# Patient Record
Sex: FEMALE | Race: WHITE | NOT HISPANIC OR LATINO | Employment: FULL TIME | URBAN - METROPOLITAN AREA
[De-identification: names, ages, dates, MRNs, and addresses within clinical notes are randomized per-mention and may not be internally consistent; named-entity substitution may affect disease eponyms.]

---

## 2017-03-23 ENCOUNTER — HOSPITAL ENCOUNTER (OUTPATIENT)
Dept: MAMMOGRAPHY | Facility: CLINIC | Age: 57
Discharge: HOME | End: 2017-03-23
Attending: OBSTETRICS & GYNECOLOGY | Admitting: OBSTETRICS & GYNECOLOGY
Payer: COMMERCIAL

## 2017-03-23 DIAGNOSIS — Z12.31 VISIT FOR SCREENING MAMMOGRAM: ICD-10-CM

## 2017-03-23 PROCEDURE — G0202 SCR MAMMO BI INCL CAD: HCPCS

## 2017-04-06 ENCOUNTER — HOSPITAL ENCOUNTER (OUTPATIENT)
Dept: MAMMOGRAPHY | Facility: CLINIC | Age: 57
End: 2017-04-06
Attending: OBSTETRICS & GYNECOLOGY
Payer: COMMERCIAL

## 2017-04-06 ENCOUNTER — HOSPITAL ENCOUNTER (OUTPATIENT)
Dept: MAMMOGRAPHY | Facility: CLINIC | Age: 57
Discharge: HOME | End: 2017-04-06
Attending: OBSTETRICS & GYNECOLOGY | Admitting: OBSTETRICS & GYNECOLOGY
Payer: COMMERCIAL

## 2017-04-06 DIAGNOSIS — R92.8 ABNORMAL MAMMOGRAM: ICD-10-CM

## 2017-04-06 PROCEDURE — 76642 ULTRASOUND BREAST LIMITED: CPT | Mod: RT

## 2017-04-06 PROCEDURE — G0279 TOMOSYNTHESIS, MAMMO: HCPCS

## 2017-04-11 ENCOUNTER — HOSPITAL ENCOUNTER (OUTPATIENT)
Dept: MAMMOGRAPHY | Facility: CLINIC | Age: 57
End: 2017-04-11
Attending: OBSTETRICS & GYNECOLOGY
Payer: COMMERCIAL

## 2017-04-11 ENCOUNTER — HOSPITAL ENCOUNTER (OUTPATIENT)
Dept: MAMMOGRAPHY | Facility: CLINIC | Age: 57
Discharge: HOME | End: 2017-04-11
Attending: OBSTETRICS & GYNECOLOGY | Admitting: OBSTETRICS & GYNECOLOGY
Payer: COMMERCIAL

## 2017-04-11 DIAGNOSIS — R92.8 ABNORMAL MAMMOGRAM: ICD-10-CM

## 2017-04-11 PROCEDURE — 00000159 ZZHCL STATISTIC H-SEND OUTS PREP: Performed by: OBSTETRICS & GYNECOLOGY

## 2017-04-11 PROCEDURE — 88342 IMHCHEM/IMCYTCHM 1ST ANTB: CPT | Mod: 26 | Performed by: OBSTETRICS & GYNECOLOGY

## 2017-04-11 PROCEDURE — 88377 M/PHMTRC ALYS ISHQUANT/SEMIQ: CPT | Performed by: OBSTETRICS & GYNECOLOGY

## 2017-04-11 PROCEDURE — 88360 TUMOR IMMUNOHISTOCHEM/MANUAL: CPT | Mod: 26 | Performed by: OBSTETRICS & GYNECOLOGY

## 2017-04-11 PROCEDURE — 00000158 ZZHCL STATISTIC H-FISH PROCESS B/S: Performed by: OBSTETRICS & GYNECOLOGY

## 2017-04-11 PROCEDURE — 88305 TISSUE EXAM BY PATHOLOGIST: CPT | Mod: 26 | Performed by: OBSTETRICS & GYNECOLOGY

## 2017-04-11 PROCEDURE — 00000093 ZZHCL STATISTIC COURTESY CONSULT: Performed by: OBSTETRICS & GYNECOLOGY

## 2017-04-11 PROCEDURE — 88305 TISSUE EXAM BY PATHOLOGIST: CPT | Performed by: OBSTETRICS & GYNECOLOGY

## 2017-04-11 PROCEDURE — 88360 TUMOR IMMUNOHISTOCHEM/MANUAL: CPT | Performed by: OBSTETRICS & GYNECOLOGY

## 2017-04-11 PROCEDURE — 19084 BX BREAST ADD LESION US IMAG: CPT | Mod: RT

## 2017-04-11 PROCEDURE — 25000125 ZZHC RX 250: Performed by: OBSTETRICS & GYNECOLOGY

## 2017-04-11 PROCEDURE — 88377 M/PHMTRC ALYS ISHQUANT/SEMIQ: CPT | Performed by: PATHOLOGY

## 2017-04-11 PROCEDURE — 88341 IMHCHEM/IMCYTCHM EA ADD ANTB: CPT | Performed by: OBSTETRICS & GYNECOLOGY

## 2017-04-11 PROCEDURE — 40000272 MA POST PROCEDURE RIGHT

## 2017-04-11 PROCEDURE — 88341 IMHCHEM/IMCYTCHM EA ADD ANTB: CPT | Mod: 26 | Performed by: OBSTETRICS & GYNECOLOGY

## 2017-04-11 PROCEDURE — 88342 IMHCHEM/IMCYTCHM 1ST ANTB: CPT | Performed by: OBSTETRICS & GYNECOLOGY

## 2017-04-11 PROCEDURE — 27210206 US BREAST BIOPSY CORE NEEDLE RIGHT

## 2017-04-11 RX ADMIN — LIDOCAINE HYDROCHLORIDE 5 ML: 10 INJECTION, SOLUTION INFILTRATION; PERINEURAL at 11:05

## 2017-04-11 NOTE — DISCHARGE INSTRUCTIONS

## 2017-04-13 ENCOUNTER — TELEPHONE (OUTPATIENT)
Dept: MAMMOGRAPHY | Facility: CLINIC | Age: 57
End: 2017-04-13

## 2017-04-13 NOTE — TELEPHONE ENCOUNTER
Discussed other options besides [facility] for follow up, now considering [facility]- gave her info on surgeon  Dr Castrejon and oncologists.there and phone contacts. She is networking and will make her decision later. She stated she could make appointment on her own from here and appreciated the assistance. Asked that I not send the resources packet in the mail.

## 2017-04-13 NOTE — TELEPHONE ENCOUNTER
Pathology report reviewed with breast radiologist Dr Barcenas  I phoned and informed patient of results showing invasive ductal breast cancer in both areas biopsied. DCIS with one area. Breast Imaging also showed calcifications at a 3rd (6;00) area which also needs to be addressed if breast conservation is considered.  Recommended follow up is Surgical consult  Patient states no problems with biopsy site, except bruising and minor achiness.  Surgical Consult wll be arranged by the patient. She has numbers for [facility]. Also gave her my number and film room number to arrange for transfer of records. She stated she would call me back with final plans  Questions were answered and I explained my role as Nurse Navigator in assisting her with appointments, resources and social support. New diagnosis information packet will be mailed. I will follow up with the patient. She has my phone number if she has further questions. Ordering provider Dr Meraz notified.

## 2017-04-14 LAB — COPATH REPORT: NORMAL

## 2017-04-18 LAB — COPATH REPORT: NORMAL

## 2017-04-19 ENCOUNTER — OFFICE VISIT (OUTPATIENT)
Dept: SURGERY | Facility: CLINIC | Age: 57
End: 2017-04-19
Payer: COMMERCIAL

## 2017-04-19 VITALS
HEART RATE: 63 BPM | SYSTOLIC BLOOD PRESSURE: 104 MMHG | WEIGHT: 144 LBS | DIASTOLIC BLOOD PRESSURE: 68 MMHG | BODY MASS INDEX: 22.6 KG/M2 | HEIGHT: 67 IN

## 2017-04-19 DIAGNOSIS — C50.111 MALIGNANT NEOPLASM OF CENTRAL PORTION OF RIGHT FEMALE BREAST (H): Primary | ICD-10-CM

## 2017-04-19 LAB — COPATH REPORT: NORMAL

## 2017-04-19 PROCEDURE — 99244 OFF/OP CNSLTJ NEW/EST MOD 40: CPT | Performed by: SURGERY

## 2017-04-19 NOTE — PROGRESS NOTES
Surgery Consultation, [facility], ROYER Saab MD    Janet Gibson MRN# [id number]   YOB: 1960 Age: 56 year old     PCP:  Lore Meraz [phone]    Chief Complaint:  New diagnosis of invasive ductal carcinoma of the right breast    Pt was seen in consultation from Lore Meraz.    History of Present Illness:  Janet Gibson is a 56 year old female who presented with two areas of suspicion on screening mammogram of the right breast.  These were followed up with diagnostic mammography and declan synthesis.  The two areas were well  and each measured less than 1 cm.  Biopsy was performed and these were both grade 1 invasive ductal carcinoma, strongly ER and NM positive.  These were HER-2/fili negative.  One of the lesions had some associated DCIS.  No lymphadenopathy was reported.  Patient has no personal history of breast issues or previous breast biopsies.  No family history of breast cancer.  She is postmenopausal and has been taking estrogen replacement, although she recently stopped with her diagnosis.  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  She is here to discuss her new diagnosis of right-sided breast cancer.    PMH:  Janet Gibson  has no past medical history on file.  PSH:  Janet Gibson  has a past surgical history that includes colonoscopy; Colonoscopy (N/A, 3/24/2015); and GYN surgery.    Home medications and allergies reviewed.    Social History:  Janet Gibson  reports that she has never smoked. She does not have any smokeless tobacco history on file. She reports that she drinks alcohol. She reports that she does not use illicit drugs.  Family History:  Janet Gibson family history includes Anesthesia Reaction in her mother; Cancer - colorectal (age of onset: 59) in her father; Hyperlipidemia in her father; Hypertension in her father and mother; Lymphoma in her brother.    ROS:  The 10 point Review  "of Systems is negative other than noted in the HPI.  No headaches or vision changes.  No weight gain or weight loss.  No fevers or chills.    Physical Exam:  Blood pressure 104/68, pulse 63, height 1.702 m (5' 7\"), weight 65.3 kg (144 lb).  144 lbs 0 oz  Thin healthy-appearing female in no distress.  Patient has a pleasant affect and communicates well.   Pupils equal round and reactive to light.   No cervical lymphadenopathy or thyromegaly.   Lung fields clear, breathing comfortably.   Heart normal sinus rhythm.  No murmurs rubs or gallops.  Bilateral breast exam performed.  Very small breasts bilaterally, no significant ptosis.  Moderate ecchymoses to the right breast.  No palpable lesions but breast tissue fairly dense.  No lesions on the left side.  No axillary or supraclavicular lymphadenopathy.  Skin warm, dry.  No obvious rashes or lesions.    All new lab and imaging data was reviewed, including pathology results and personal review of imaging studies.       Assessment/plan:  Pleasant healthy 56-year-old female with a new diagnosis of multifocal right-sided breast cancer.  This was a grade 1 invasive ductal carcinoma.  This is ER/VA positive, HER-2/fili negative.  She has no family history of breast cancer, but we did discuss the potential merits of genetic counseling.  We discussed the surgical management of breast cancer, which usually consists of either lumpectomy or mastectomy.  I question the cosmetic outcome of a lumpectomy, given her extremely small breast size and the fact that she would need two separate lumpectomies.  In general, given her small breast size and the two lesions, I'm inclined to favor nipple sparing mastectomy.  We discussed the logistics of this procedure.  This would be accompanied by sentinel lymph node biopsy with possible axillary node dissection.  We also discussed the potential role of radiation with lumpectomy or mastectomy and chemotherapy.  At this point, I don't see any " obvious indications which would mandate chemotherapy, but this is certainly a matter to be discussed more closely with the medical oncologist.  I explained that mastectomy and lumpectomy followed by radiation carry similar survival rates at 20 years.  She was in the process of getting information was likely going to be getting a second opinion.  If she is interested in proceeding further with my group, I would likely get her scheduled me with one plastic surgeons to discuss possible reconstruction options.  Overall, I feel that these small low-grade tumors would give her excellent prognosis.    Rahul Saab M.D.  [facility]  [phone]    Please route or send letter to:  Primary Care Provider (PCP) and Referring Provider

## 2017-04-19 NOTE — MR AVS SNAPSHOT
"              After Visit Summary   2017    Janet Gibson    MRN: [id number]           Patient Information     Date Of Birth          1960        Visit Information        Provider Department      2017 11:15 AM Isaiah Saab MD [facility] General Surgery       Follow-ups after your visit        Who to contact     If you have questions or need follow up information about today's clinic visit or your schedule please contact [facility] directly at [phone].  Normal or non-critical lab and imaging results will be communicated to you by ACellhart, letter or phone within 4 business days after the clinic has received the results. If you do not hear from us within 7 days, please contact the clinic through ACellhart or phone. If you have a critical or abnormal lab result, we will notify you by phone as soon as possible.  Submit refill requests through Foody or call your pharmacy and they will forward the refill request to us. Please allow 3 business days for your refill to be completed.          Additional Information About Your Visit        MyChart Information     Foody lets you send messages to your doctor, view your test results, renew your prescriptions, schedule appointments and more. To sign up, go to [email or web address] . Click on \"Log in\" on the left side of the screen, which will take you to the Welcome page. Then click on \"Sign up Now\" on the right side of the page.     You will be asked to enter the access code listed below, as well as some personal information. Please follow the directions to create your username and password.     Your access code is: [email or web address]  Expires: 2017 12:16 PM     Your access code will  in 90 days. If you need help or a new code, please call your [place] clinic or [phone].        Care EveryWhere ID     This is your Care EveryWhere ID. This could be used by other " "organizations to access your [place] medical records  [id number]        Your Vitals Were     Pulse Height BMI (Body Mass Index)             63 5' 7\" (1.702 m) 22.55 kg/m2          Blood Pressure from Last 3 Encounters:   04/19/17 104/68   03/24/15 104/69    Weight from Last 3 Encounters:   04/19/17 144 lb (65.3 kg)   03/24/15 143 lb (64.9 kg)              Today, you had the following     No orders found for display       Primary Care Provider Office Phone # Fax #    Lore Karely Meraz [phone]       [facility] [address] [facility] [address]  [facility] [address]        Thank you!     Thank you for choosing [facility]  for your care. Our goal is always to provide you with excellent care. Hearing back from our patients is one way we can continue to improve our services. Please take a few minutes to complete the written survey that you may receive in the mail after your visit with us. Thank you!             Your Updated Medication List - Protect others around you: Learn how to safely use, store and throw away your medicines at www.disposemymeds.org.          This list is accurate as of: 4/19/17 12:16 PM.  Always use your most recent med list.                   Brand Name Dispense Instructions for use    LEXAPRO PO      Take 5 mg by mouth At Bedtime         "

## 2017-04-19 NOTE — NURSING NOTE
Breast Patients    BREAST PATIENTS (ALL)    1-Do you have any of the following symptoms? None   2-In which breast are you having the symptoms? right  3-Do you use hormones?  No, just stopped Estradiol   4-Have you had a Mammogram? Yes  Where: [facility]  Date: 4/6/17  5-Have you ever had a breast cyst drained? No  6-Have you ever had a breast biopsy? Yes  Side: Right  Date: 4/11/17  7-Have you ever had a Breast Cancer? No   8-Is there a history of Breast Cancer in your family? No  9-Have you ever had Ovarian Cancer? No  10-Is there a history of Ovarian Cancer in your family? No  11-Summarize your caffeine intake (i.e. coffee, tea, chocolate, soda etc.): 2 cups of coffee daily     BREAST PATIENTS (FEMALE)    12-What age did your periods begin? 15  13-Date your last menstrual period began? Age 49  14-Number of full-term pregnancies: 3  15-Your age when your first child was born? 30  16-Did you nurse your children? Yes  17-Are you pregnant now? No  18-Have you begun menopause? Yes  Age Menopause began:  49  19-Have you had either ovary removed?No  20-Do you have breast implants? No     Paloma Camacho MA

## 2020-02-18 RX ORDER — LIDOCAINE 40 MG/G
CREAM TOPICAL
Status: CANCELLED | OUTPATIENT
Start: 2020-02-18

## 2020-02-18 RX ORDER — ONDANSETRON 2 MG/ML
4 INJECTION INTRAMUSCULAR; INTRAVENOUS
Status: CANCELLED | OUTPATIENT
Start: 2020-02-18

## 2020-05-07 DIAGNOSIS — Z11.59 ENCOUNTER FOR SCREENING FOR OTHER VIRAL DISEASES: Primary | ICD-10-CM

## 2020-06-20 DIAGNOSIS — Z11.59 ENCOUNTER FOR SCREENING FOR OTHER VIRAL DISEASES: ICD-10-CM

## 2020-06-20 PROCEDURE — 99000 SPECIMEN HANDLING OFFICE-LAB: CPT | Performed by: COLON & RECTAL SURGERY

## 2020-06-20 PROCEDURE — U0003 INFECTIOUS AGENT DETECTION BY NUCLEIC ACID (DNA OR RNA); SEVERE ACUTE RESPIRATORY SYNDROME CORONAVIRUS 2 (SARS-COV-2) (CORONAVIRUS DISEASE [COVID-19]), AMPLIFIED PROBE TECHNIQUE, MAKING USE OF HIGH THROUGHPUT TECHNOLOGIES AS DESCRIBED BY CMS-2020-01-R: HCPCS | Mod: 90 | Performed by: COLON & RECTAL SURGERY

## 2020-06-21 LAB
SARS-COV-2 RNA SPEC QL NAA+PROBE: NOT DETECTED
SPECIMEN SOURCE: NORMAL

## 2020-06-23 ENCOUNTER — HOSPITAL ENCOUNTER (OUTPATIENT)
Facility: CLINIC | Age: 60
Discharge: HOME | End: 2020-06-23
Attending: COLON & RECTAL SURGERY | Admitting: COLON & RECTAL SURGERY
Payer: COMMERCIAL

## 2020-06-23 VITALS
DIASTOLIC BLOOD PRESSURE: 71 MMHG | SYSTOLIC BLOOD PRESSURE: 108 MMHG | HEART RATE: 50 BPM | OXYGEN SATURATION: 100 % | RESPIRATION RATE: 10 BRPM | HEIGHT: 67 IN | BODY MASS INDEX: 21.97 KG/M2 | WEIGHT: 140 LBS

## 2020-06-23 LAB — COLONOSCOPY: NORMAL

## 2020-06-23 PROCEDURE — 25000128 H RX IP 250 OP 636: Performed by: COLON & RECTAL SURGERY

## 2020-06-23 PROCEDURE — G0500 MOD SEDAT ENDO SERVICE >5YRS: HCPCS | Performed by: COLON & RECTAL SURGERY

## 2020-06-23 PROCEDURE — 99153 MOD SED SAME PHYS/QHP EA: CPT

## 2020-06-23 PROCEDURE — 45378 DIAGNOSTIC COLONOSCOPY: CPT | Performed by: COLON & RECTAL SURGERY

## 2020-06-23 PROCEDURE — G0105 COLORECTAL SCRN; HI RISK IND: HCPCS | Performed by: COLON & RECTAL SURGERY

## 2020-06-23 RX ORDER — DIPHENHYDRAMINE HYDROCHLORIDE 50 MG/ML
INJECTION INTRAMUSCULAR; INTRAVENOUS PRN
Status: DISCONTINUED | OUTPATIENT
Start: 2020-06-23 | End: 2020-06-23 | Stop reason: HOSPADM

## 2020-06-23 RX ORDER — ONDANSETRON 2 MG/ML
4 INJECTION INTRAMUSCULAR; INTRAVENOUS
Status: COMPLETED | OUTPATIENT
Start: 2020-06-23 | End: 2020-06-23

## 2020-06-23 RX ORDER — NALOXONE HYDROCHLORIDE 0.4 MG/ML
.1-.4 INJECTION, SOLUTION INTRAMUSCULAR; INTRAVENOUS; SUBCUTANEOUS
Status: DISCONTINUED | OUTPATIENT
Start: 2020-06-23 | End: 2020-06-23 | Stop reason: HOSPADM

## 2020-06-23 RX ORDER — LIDOCAINE 40 MG/G
CREAM TOPICAL
Status: DISCONTINUED | OUTPATIENT
Start: 2020-06-23 | End: 2020-06-23 | Stop reason: HOSPADM

## 2020-06-23 RX ORDER — FLUMAZENIL 0.1 MG/ML
0.2 INJECTION, SOLUTION INTRAVENOUS
Status: DISCONTINUED | OUTPATIENT
Start: 2020-06-23 | End: 2020-06-23 | Stop reason: HOSPADM

## 2020-06-23 RX ORDER — FENTANYL CITRATE 50 UG/ML
INJECTION, SOLUTION INTRAMUSCULAR; INTRAVENOUS PRN
Status: DISCONTINUED | OUTPATIENT
Start: 2020-06-23 | End: 2020-06-23 | Stop reason: HOSPADM

## 2020-06-23 RX ORDER — ONDANSETRON 4 MG/1
4 TABLET, ORALLY DISINTEGRATING ORAL EVERY 6 HOURS PRN
Status: DISCONTINUED | OUTPATIENT
Start: 2020-06-23 | End: 2020-06-23 | Stop reason: HOSPADM

## 2020-06-23 RX ORDER — PROCHLORPERAZINE MALEATE 10 MG
10 TABLET ORAL EVERY 6 HOURS PRN
Status: DISCONTINUED | OUTPATIENT
Start: 2020-06-23 | End: 2020-06-23 | Stop reason: HOSPADM

## 2020-06-23 RX ORDER — ONDANSETRON 2 MG/ML
4 INJECTION INTRAMUSCULAR; INTRAVENOUS EVERY 6 HOURS PRN
Status: DISCONTINUED | OUTPATIENT
Start: 2020-06-23 | End: 2020-06-23 | Stop reason: HOSPADM

## 2020-06-23 ASSESSMENT — MIFFLIN-ST. JEOR: SCORE: 1242.67

## 2020-06-23 NOTE — H&P
"Pre-Endoscopy History and Physical   Janet Gibson MRN# [id number]   YOB: 1960 Age: 59 year old   Date of Procedure: 6/23/2020   Primary care provider: Lore Meraz   Type of Endoscopy: colonoscopy   Reason for Procedure: personal history of polyps   Type of Anesthesia Anticipated: Moderate Sedation   HPI:   Janet is a 59 year old female with a personal history of polyps.  She last had a colonoscopy in 2015 that was normal.  She denies BRBPR, abdominal pain, nausea/vomiting, changes in bowel habits or unintentional weight loss.  Her father had colon cancer in his 50s.    No Known Allergies   None      Patient Active Problem List   Diagnosis     Malignant neoplasm of central portion of right female breast (H)      Past Medical History:   Diagnosis Date     Cancer (H)     breast cancer treated with surgery only     Colon polyp       Past Surgical History:   Procedure Laterality Date     BREAST SURGERY      right mastectomy and replacement     COLONOSCOPY       COLONOSCOPY N/A 3/24/2015    Procedure: COLONOSCOPY;  Surgeon: Karely Junior MD;  Location:  GI     GYN SURGERY      ovarian cyst removed      Social History     Tobacco Use     Smoking status: Never Smoker     Smokeless tobacco: Never Used   Substance Use Topics     Alcohol use: Yes     Comment: 2 week      Family History   Problem Relation Age of Onset     Cancer - colorectal Father 59     Hypertension Father      Hyperlipidemia Father      Hypertension Mother      Anesthesia Reaction Mother      Lymphoma Brother       PHYSICAL EXAM:   /86   Resp 16   Ht 1.702 m (5' 7\")   Wt 63.5 kg (140 lb)   SpO2 100%   BMI 21.93 kg/m   Estimated body mass index is 21.93 kg/m  as calculated from the following:    Height as of this encounter: 1.702 m (5' 7\").    Weight as of this encounter: 63.5 kg (140 lb).   RESP: lungs clear to auscultation - no rales, rhonchi or wheezes   CV: regular rates and rhythm   ASA Class 2 - Mild " systemic disease    Assessment: 58 y/o woman with a personal history of polyps and a FH of CRC.    Plan: Colonoscopy with moderate sedation.  Risks of the procedure were discussed including, but not limited to, bleeding, perforation and missed lesions.  Patient understands and is willing to proceed.    Sebastián Gee MD ....................  6/23/2020   9:06 AM  Colon and Rectal Surgery Staff  [phone]

## 2020-11-11 ENCOUNTER — VIRTUAL VISIT (OUTPATIENT)
Dept: FAMILY MEDICINE | Facility: OTHER | Age: 60
End: 2020-11-11
Payer: COMMERCIAL

## 2020-11-11 PROCEDURE — 99421 OL DIG E/M SVC 5-10 MIN: CPT | Performed by: PHYSICIAN ASSISTANT

## 2020-11-11 NOTE — PROGRESS NOTES
"Date: 2020 09:26:51  Clinician: Lainey Craig  Clinician NPI: [id number]  Patient: Janet Gibson  Patient : 1960  Patient Address: [address] [place] [address]  Patient Phone: [phone]  Visit Protocol: URI  Patient Summary:  Janet is a 60 year old ( : 1960 ) female who initiated a [facility] Visit for COVID-19 (Coronavirus) evaluation and screening. When asked the question \"Please sign me up to receive news, health information and promotions from [facility].\", Janet responded \"No\".    Janet states her symptoms started 1-2 days ago.   Her symptoms consist of myalgia, chills, malaise, a sore throat, a headache, a cough, and nasal congestion. She is experiencing difficulty breathing due to nasal congestion but she is not short of breath.   Symptom details     Nasal secretions: The color of her mucus is clear.    Cough: Janet coughs every 5-10 minutes and her cough is more bothersome at night. Phlegm does not come into her throat when she coughs. She does not believe her cough is caused by post-nasal drip.     Sore throat: Janet reports having mild throat pain (1-3 on a 10 point pain scale), does not have exudate on her tonsils, and can swallow liquids. The lymph nodes in her neck are not enlarged. A rash has not appeared on the skin since the sore throat started.     Headache: She states the headache is mild (1-3 on a 10 point pain scale).      Janet denies having vomiting, rhinitis, facial pain or pressure, teeth pain, ageusia, diarrhea, ear pain, wheezing, fever, enlarged lymph nodes, nausea, and anosmia. She also denies taking antibiotic medication in the past month and having recent facial or sinus surgery in the past 60 days.   Precipitating events  Within the past week, Janet has not been exposed to someone with strep throat. She has not recently been exposed to someone with influenza. Janet has not been in close contact with any high risk individuals.   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   Janet has not had a close contact with a laboratory-confirmed COVID-19 patient within 14 days of symptom onset.    Since December 2019, Janet has been tested for COVID-19 and has had upper respiratory infection (URI) or influenza-like illness.      Result of COVID-19 test: Negative     Date of her COVID-19 test: 06/10/2020     Date(s) of previous URI or influenza-like illness (free-text): 11/10/20     Symptoms Janet experienced during previous URI or influenza-like illness as reported by the patient (free-text): Current SX        Pertinent medical history  Janet does not get yeast infections when she takes antibiotics.   Janet does not need a return to work/school note.   Weight: 140 lbs   Janet does not smoke or use smokeless tobacco.   Weight: 140 lbs  Reason for repeat visit for the same protocol within 24 hours:  Didn't complete the last visit  See the History of referred by protocol and completed visits section for details on previous visits (visits currently in queue to be diagnosed will not appear in this section).    MEDICATIONS: No current medications, ALLERGIES: NKDA  Clinician Response:  Dear Janet,  Based on the information provided, you have a viral upper respiratory infection, otherwise known as a cold. Symptoms vary from person to person, but can include sneezing, coughing, a runny nose, sore throat, and headache and range from mild to severe.  Unfortunately, there are no medications that can cure a cold, so treatment is focused on controlling symptoms as much as possible. Most people gradually feel better until symptoms are  gone in 1-2 weeks.  Medication information  Because you have a viral infection, antibiotics will not help you get better. Treating a viral infection with antibiotics could actually make you feel worse.  Self care  Steps you can take to be as comfortable as possible:     Rest.    Drink plenty of fluids.    Take a warm shower to loosen congestion    Use a cool-mist humidifier.    Use throat lozenges.    Suck on frozen items such as popsicles.    Drink hot tea with lemon and honey.    Gargle with warm salt water (1/4 teaspoon of salt per 8 ounce glass of water).    Take a spoonful of honey to reduce your cough.     When to seek care  Please be seen in a clinic or urgent care if any of the following occur:   New symptoms develop, or symptoms become worse   Call ahead before going to the clinic or urgent care.  Call 911 or go to the emergency room if you feel that your throat is closing off, you suddenly develop a rash, you are unable to swallow fluids, you are drooling, or you are having difficulty breathing.  Additional treatment plan   Your symptoms show that you may have coronavirus (COVID-19). This illness can cause fever, cough and trouble breathing. Many people get a mild case and get better on their own. Some people can get very sick.  Based on the symptoms you have shared, I would like you to be re-checked in 2 to 3 days. Please call your family clinic to set up a video or phone visit.  Will I be tested for COVID-19?  We would like to test you for this virus.   Please call [phone] to schedule your visit. Explain that you were [facility] have [facility] COVID-19 [facility]. Be ready to share your [facility] visit ID number.   * If you need to schedule in [place] or [facility] please call [phone] or for [place] employees please call [phone].    The following will serve as your written order for this COVID Test, ordered by me, for the indication of suspected COVID [Z20.828]: The test will be ordered  "in Epic, our electronic health record, after you are scheduled. It will show as ordered and authorized by Lopez Duron MD.  Order: COVID-19 (Coronavirus) PCR for SYMPTOMATIC testing from [facility].   1.When it's time for your COVID test:   Stay at least 6 feet away from others. (If someone will drive you to your test, stay in the backseat, as far away from the  as you can.)   Cover your mouth and nose with a mask, tissue or washcloth.  Go straight to the testing site. Don't make any stops on the way there or back.      2.Starting now: Stay home and away from others (self-isolate) until:   You've had no fever---and no medicine that reduces fever---for one full day (24 hours). And...   Your other symptoms have gotten better. For example, your cough or breathing has improved. And...   At least 10 days have passed since your symptoms started.       During this time, don't leave the house except for testing or medical care.   Stay in your own room, even for meals. Use your own bathroom if you can.   Stay away from others in your home. No hugging, kissing or shaking hands. No visitors.  Don't go to work, school or anywhere else.    Clean \"high touch\" surfaces often (doorknobs, counters, handles, etc.). Use a household cleaning spray or wipes. You'll find a full list of  on the EPA website: www.epa.gov/pesticide-registration/list-n-disinfectants-use-against-sars-cov-2.   Cover your mouth and nose with a mask, tissue or washcloth to avoid spreading germs.  Wash your hands and face often. Use soap and water.  Caregivers in these groups are at risk for severe illness due to COVID-19:  o People 65 years and older  o People who live in a nursing home or long-term care facility  o People with chronic disease (lung, heart, cancer, diabetes, kidney, liver, immunologic)   o People who have a weakened immune system, including those who:   Are in cancer treatment  Take medicine that weakens the immune system, such as " corticosteroids  Had a bone marrow or organ transplant  Have an immune deficiency  Have poorly controlled HIV or AIDS  Are obese (body mass index of 40 or higher)  Smoke regularly   o Caregivers should wear gloves while washing dishes, handling laundry and cleaning bedrooms and bathrooms.  o Use caution when washing and drying laundry: Don't shake dirty laundry, and use the warmest water setting that you can.  o For more tips, go to www.cdc.gov/coronavirus/2019-ncov/downloads/10Things.pdf.      How can I take care of myself?   Get lots of rest. Drink extra fluids (unless a doctor has told you not to)   Take Tylenol (acetaminophen) for fever or pain. If you have liver or kidney problems, ask your family doctor if it's okay to take Tylenol.   Adults can take either:    650 mg (two 325 mg pills) every 4 to 6 hours, or...   1,000 mg (two 500 mg pills) every 8 hours as needed.    Note: Don't take more than 3,000 mg in one day. Acetaminophen is found in many medicines (both prescribed and over-the-counter medicines). Read all labels to be sure you don't take too much.   For children, check the Tylenol bottle for the right dose. The dose is based on the child's age or weight.    If you have other health problems (like cancer, heart failure, an organ transplant or severe kidney disease): Call your specialty clinic if you don't feel better in the next 2 days.       Know when to call 911. Emergency warning signs include:    Trouble breathing or shortness of breath Pain or pressure in the chest that doesn't go away Feeling confused like you haven't felt before, or not being able to wake up Bluish-colored lips or face  Where can I get more information?    [employer] -- About COVID-19: [email or web address]   CDC -- What to Do If You're Sick: www.cdc.gov/coronavirus/2019-ncov/about/steps-when-sick.html   CDC -- Ending Home Isolation: www.cdc.gov/coronavirus/2019-ncov/hcp/disposition-in-home-patients.html   [facility]  -- Caring for Someone: www.cdc.gov/coronavirus/2019-ncov/if-you-are-sick/care-for-someone.html   [facility] -- Interim Guidance for Hospital Discharge to Home: www.health.[facility].[place]/diseases/coronavirus/hcp/hospdischarge.pdf   [facility] clinical trials (COVID-19 research studies): clinicalaffairs.[facility]-clinical-trials    Below are the COVID-19 hotlines at the [place] Department of Health ([facility]). Interpreters are available.    For health questions: Call 174-095-7974 or 1-291.461.3486 (7 a.m. to 7 p.m.) For questions about schools and childcare: Call 477-421-2871 or 1-829.113.3191 (7 a.m. to 7 p.m.)       Diagnosis: Contact with and (suspected) exposure to other viral communicable diseases  Diagnosis ICD: Z20.828

## 2020-11-12 DIAGNOSIS — Z20.822 ENCOUNTER FOR LABORATORY TESTING FOR COVID-19 VIRUS: Primary | ICD-10-CM

## 2020-11-12 PROCEDURE — U0003 INFECTIOUS AGENT DETECTION BY NUCLEIC ACID (DNA OR RNA); SEVERE ACUTE RESPIRATORY SYNDROME CORONAVIRUS 2 (SARS-COV-2) (CORONAVIRUS DISEASE [COVID-19]), AMPLIFIED PROBE TECHNIQUE, MAKING USE OF HIGH THROUGHPUT TECHNOLOGIES AS DESCRIBED BY CMS-2020-01-R: HCPCS | Performed by: FAMILY MEDICINE

## 2020-11-13 LAB
SARS-COV-2 RNA SPEC QL NAA+PROBE: NOT DETECTED
SPECIMEN SOURCE: NORMAL

## 2021-01-15 ENCOUNTER — HEALTH MAINTENANCE LETTER (OUTPATIENT)
Age: 61
End: 2021-01-15

## 2021-10-24 ENCOUNTER — HEALTH MAINTENANCE LETTER (OUTPATIENT)
Age: 61
End: 2021-10-24

## 2022-02-13 ENCOUNTER — HEALTH MAINTENANCE LETTER (OUTPATIENT)
Age: 62
End: 2022-02-13

## 2022-10-15 ENCOUNTER — HEALTH MAINTENANCE LETTER (OUTPATIENT)
Age: 62
End: 2022-10-15

## 2023-03-26 ENCOUNTER — HEALTH MAINTENANCE LETTER (OUTPATIENT)
Age: 63
End: 2023-03-26

## 2024-08-07 ENCOUNTER — HOSPITAL ENCOUNTER (INPATIENT)
Facility: CLINIC | Age: 64
LOS: 2 days | Discharge: HOME | DRG: 372 | End: 2024-08-10
Attending: STUDENT IN AN ORGANIZED HEALTH CARE EDUCATION/TRAINING PROGRAM | Admitting: HOSPITALIST
Payer: COMMERCIAL

## 2024-08-07 ENCOUNTER — APPOINTMENT (OUTPATIENT)
Dept: CT IMAGING | Facility: CLINIC | Age: 64
DRG: 372 | End: 2024-08-07
Attending: STUDENT IN AN ORGANIZED HEALTH CARE EDUCATION/TRAINING PROGRAM
Payer: COMMERCIAL

## 2024-08-07 DIAGNOSIS — K76.9 LIVER LESION: ICD-10-CM

## 2024-08-07 DIAGNOSIS — A04.5 CAMPYLOBACTER DIARRHEA: Primary | ICD-10-CM

## 2024-08-07 DIAGNOSIS — E27.9 ADRENAL NODULE (H): ICD-10-CM

## 2024-08-07 DIAGNOSIS — K51.00 PANCOLITIS (H): ICD-10-CM

## 2024-08-07 DIAGNOSIS — D69.6 THROMBOCYTOPENIA (H): ICD-10-CM

## 2024-08-07 DIAGNOSIS — R91.1 PULMONARY NODULE: ICD-10-CM

## 2024-08-07 LAB
ALBUMIN SERPL BCG-MCNC: 4.1 G/DL (ref 3.5–5.2)
ALBUMIN UR-MCNC: 30 MG/DL
ALP SERPL-CCNC: 92 U/L (ref 40–150)
ALT SERPL W P-5'-P-CCNC: 44 U/L (ref 0–50)
ANION GAP SERPL CALCULATED.3IONS-SCNC: 10 MMOL/L (ref 7–15)
APPEARANCE UR: CLEAR
AST SERPL W P-5'-P-CCNC: 53 U/L (ref 0–45)
BACTERIA #/AREA URNS HPF: ABNORMAL /HPF
BASOPHILS # BLD AUTO: 0 10E3/UL (ref 0–0.2)
BASOPHILS NFR BLD AUTO: 0 %
BILIRUB SERPL-MCNC: 0.5 MG/DL
BILIRUB UR QL STRIP: NEGATIVE
BUN SERPL-MCNC: 9.1 MG/DL (ref 8–23)
C DIFF TOX B STL QL: NEGATIVE
CALCIUM SERPL-MCNC: 9.2 MG/DL (ref 8.8–10.4)
CHLORIDE SERPL-SCNC: 99 MMOL/L (ref 98–107)
COLOR UR AUTO: YELLOW
CREAT SERPL-MCNC: 0.85 MG/DL (ref 0.51–0.95)
EGFRCR SERPLBLD CKD-EPI 2021: 77 ML/MIN/1.73M2
EOSINOPHIL # BLD AUTO: 0 10E3/UL (ref 0–0.7)
EOSINOPHIL NFR BLD AUTO: 0 %
ERYTHROCYTE [DISTWIDTH] IN BLOOD BY AUTOMATED COUNT: 12 % (ref 10–15)
GLUCOSE SERPL-MCNC: 122 MG/DL (ref 70–99)
GLUCOSE UR STRIP-MCNC: NEGATIVE MG/DL
HCO3 SERPL-SCNC: 26 MMOL/L (ref 22–29)
HCT VFR BLD AUTO: 38.4 % (ref 35–47)
HGB BLD-MCNC: 12.7 G/DL (ref 11.7–15.7)
HGB UR QL STRIP: ABNORMAL
IMM GRANULOCYTES # BLD: 0 10E3/UL
IMM GRANULOCYTES NFR BLD: 0 %
KETONES UR STRIP-MCNC: 20 MG/DL
LACTATE SERPL-SCNC: 0.8 MMOL/L (ref 0.7–2)
LEUKOCYTE ESTERASE UR QL STRIP: ABNORMAL
LIPASE SERPL-CCNC: 14 U/L (ref 13–60)
LYMPHOCYTES # BLD AUTO: 0.5 10E3/UL (ref 0.8–5.3)
LYMPHOCYTES NFR BLD AUTO: 7 %
MCH RBC QN AUTO: 30.8 PG (ref 26.5–33)
MCHC RBC AUTO-ENTMCNC: 33.1 G/DL (ref 31.5–36.5)
MCV RBC AUTO: 93 FL (ref 78–100)
MONOCYTES # BLD AUTO: 0.4 10E3/UL (ref 0–1.3)
MONOCYTES NFR BLD AUTO: 6 %
NEUTROPHILS # BLD AUTO: 5.5 10E3/UL (ref 1.6–8.3)
NEUTROPHILS NFR BLD AUTO: 86 %
NITRATE UR QL: NEGATIVE
NRBC # BLD AUTO: 0 10E3/UL
NRBC BLD AUTO-RTO: 0 /100
PH UR STRIP: 7.5 [PH] (ref 5–7)
PLATELET # BLD AUTO: 121 10E3/UL (ref 150–450)
POTASSIUM SERPL-SCNC: 3.9 MMOL/L (ref 3.4–5.3)
PROT SERPL-MCNC: 6.4 G/DL (ref 6.4–8.3)
RBC # BLD AUTO: 4.13 10E6/UL (ref 3.8–5.2)
RBC URINE: 17 /HPF
SODIUM SERPL-SCNC: 135 MMOL/L (ref 135–145)
SP GR UR STRIP: 1.01 (ref 1–1.03)
SQUAMOUS EPITHELIAL: 13 /HPF
UROBILINOGEN UR STRIP-MCNC: NORMAL MG/DL
WBC # BLD AUTO: 6.4 10E3/UL (ref 4–11)
WBC URINE: 4 /HPF

## 2024-08-07 PROCEDURE — 250N000011 HC RX IP 250 OP 636: Performed by: STUDENT IN AN ORGANIZED HEALTH CARE EDUCATION/TRAINING PROGRAM

## 2024-08-07 PROCEDURE — 99285 EMERGENCY DEPT VISIT HI MDM: CPT | Mod: 25

## 2024-08-07 PROCEDURE — 96376 TX/PRO/DX INJ SAME DRUG ADON: CPT

## 2024-08-07 PROCEDURE — 250N000013 HC RX MED GY IP 250 OP 250 PS 637: Performed by: HOSPITALIST

## 2024-08-07 PROCEDURE — 258N000003 HC RX IP 258 OP 636: Performed by: HOSPITALIST

## 2024-08-07 PROCEDURE — 87493 C DIFF AMPLIFIED PROBE: CPT | Performed by: HOSPITALIST

## 2024-08-07 PROCEDURE — 36415 COLL VENOUS BLD VENIPUNCTURE: CPT | Performed by: STUDENT IN AN ORGANIZED HEALTH CARE EDUCATION/TRAINING PROGRAM

## 2024-08-07 PROCEDURE — 96361 HYDRATE IV INFUSION ADD-ON: CPT

## 2024-08-07 PROCEDURE — 250N000013 HC RX MED GY IP 250 OP 250 PS 637: Performed by: INTERNAL MEDICINE

## 2024-08-07 PROCEDURE — 81001 URINALYSIS AUTO W/SCOPE: CPT | Performed by: STUDENT IN AN ORGANIZED HEALTH CARE EDUCATION/TRAINING PROGRAM

## 2024-08-07 PROCEDURE — 82247 BILIRUBIN TOTAL: CPT | Performed by: STUDENT IN AN ORGANIZED HEALTH CARE EDUCATION/TRAINING PROGRAM

## 2024-08-07 PROCEDURE — G0378 HOSPITAL OBSERVATION PER HR: HCPCS

## 2024-08-07 PROCEDURE — 258N000003 HC RX IP 258 OP 636: Performed by: STUDENT IN AN ORGANIZED HEALTH CARE EDUCATION/TRAINING PROGRAM

## 2024-08-07 PROCEDURE — 85025 COMPLETE CBC W/AUTO DIFF WBC: CPT | Performed by: STUDENT IN AN ORGANIZED HEALTH CARE EDUCATION/TRAINING PROGRAM

## 2024-08-07 PROCEDURE — 258N000003 HC RX IP 258 OP 636: Performed by: INTERNAL MEDICINE

## 2024-08-07 PROCEDURE — 96374 THER/PROPH/DIAG INJ IV PUSH: CPT | Mod: 59

## 2024-08-07 PROCEDURE — 83605 ASSAY OF LACTIC ACID: CPT | Performed by: STUDENT IN AN ORGANIZED HEALTH CARE EDUCATION/TRAINING PROGRAM

## 2024-08-07 PROCEDURE — 96375 TX/PRO/DX INJ NEW DRUG ADDON: CPT

## 2024-08-07 PROCEDURE — 99222 1ST HOSP IP/OBS MODERATE 55: CPT | Performed by: HOSPITALIST

## 2024-08-07 PROCEDURE — 250N000011 HC RX IP 250 OP 636: Performed by: INTERNAL MEDICINE

## 2024-08-07 PROCEDURE — 74177 CT ABD & PELVIS W/CONTRAST: CPT

## 2024-08-07 PROCEDURE — 83690 ASSAY OF LIPASE: CPT | Performed by: STUDENT IN AN ORGANIZED HEALTH CARE EDUCATION/TRAINING PROGRAM

## 2024-08-07 PROCEDURE — 87507 IADNA-DNA/RNA PROBE TQ 12-25: CPT | Performed by: HOSPITALIST

## 2024-08-07 PROCEDURE — 250N000011 HC RX IP 250 OP 636: Performed by: HOSPITALIST

## 2024-08-07 RX ORDER — KETOROLAC TROMETHAMINE 15 MG/ML
30 INJECTION, SOLUTION INTRAMUSCULAR; INTRAVENOUS EVERY 6 HOURS PRN
Status: DISCONTINUED | OUTPATIENT
Start: 2024-08-07 | End: 2024-08-08

## 2024-08-07 RX ORDER — PROCHLORPERAZINE 25 MG
25 SUPPOSITORY, RECTAL RECTAL EVERY 12 HOURS PRN
Status: DISCONTINUED | OUTPATIENT
Start: 2024-08-07 | End: 2024-08-10 | Stop reason: HOSPADM

## 2024-08-07 RX ORDER — ONDANSETRON 2 MG/ML
4 INJECTION INTRAMUSCULAR; INTRAVENOUS EVERY 6 HOURS PRN
Status: DISCONTINUED | OUTPATIENT
Start: 2024-08-07 | End: 2024-08-10 | Stop reason: HOSPADM

## 2024-08-07 RX ORDER — OXYCODONE HYDROCHLORIDE 5 MG/1
5 TABLET ORAL EVERY 4 HOURS PRN
Status: DISCONTINUED | OUTPATIENT
Start: 2024-08-07 | End: 2024-08-10 | Stop reason: HOSPADM

## 2024-08-07 RX ORDER — MORPHINE SULFATE 4 MG/ML
4 INJECTION, SOLUTION INTRAMUSCULAR; INTRAVENOUS ONCE
Status: COMPLETED | OUTPATIENT
Start: 2024-08-07 | End: 2024-08-07

## 2024-08-07 RX ORDER — NALOXONE HYDROCHLORIDE 0.4 MG/ML
0.4 INJECTION, SOLUTION INTRAMUSCULAR; INTRAVENOUS; SUBCUTANEOUS
Status: DISCONTINUED | OUTPATIENT
Start: 2024-08-07 | End: 2024-08-10

## 2024-08-07 RX ORDER — ONDANSETRON 4 MG/1
4 TABLET, ORALLY DISINTEGRATING ORAL EVERY 6 HOURS PRN
Status: DISCONTINUED | OUTPATIENT
Start: 2024-08-07 | End: 2024-08-10 | Stop reason: HOSPADM

## 2024-08-07 RX ORDER — IOPAMIDOL 755 MG/ML
71 INJECTION, SOLUTION INTRAVASCULAR ONCE
Status: COMPLETED | OUTPATIENT
Start: 2024-08-07 | End: 2024-08-07

## 2024-08-07 RX ORDER — OXYCODONE HYDROCHLORIDE 5 MG/1
10 TABLET ORAL EVERY 4 HOURS PRN
Status: DISCONTINUED | OUTPATIENT
Start: 2024-08-07 | End: 2024-08-10 | Stop reason: HOSPADM

## 2024-08-07 RX ORDER — SODIUM CHLORIDE, SODIUM LACTATE, POTASSIUM CHLORIDE, CALCIUM CHLORIDE 600; 310; 30; 20 MG/100ML; MG/100ML; MG/100ML; MG/100ML
INJECTION, SOLUTION INTRAVENOUS CONTINUOUS
Status: DISCONTINUED | OUTPATIENT
Start: 2024-08-07 | End: 2024-08-10

## 2024-08-07 RX ORDER — ACETAMINOPHEN 325 MG/1
650 TABLET ORAL EVERY 4 HOURS PRN
Status: DISCONTINUED | OUTPATIENT
Start: 2024-08-07 | End: 2024-08-07

## 2024-08-07 RX ORDER — HYDROMORPHONE HYDROCHLORIDE 1 MG/ML
0.5 INJECTION, SOLUTION INTRAMUSCULAR; INTRAVENOUS; SUBCUTANEOUS
Status: DISCONTINUED | OUTPATIENT
Start: 2024-08-07 | End: 2024-08-10 | Stop reason: HOSPADM

## 2024-08-07 RX ORDER — ACETAMINOPHEN 650 MG/1
650 SUPPOSITORY RECTAL EVERY 4 HOURS PRN
Status: DISCONTINUED | OUTPATIENT
Start: 2024-08-07 | End: 2024-08-10 | Stop reason: HOSPADM

## 2024-08-07 RX ORDER — ONDANSETRON 2 MG/ML
4 INJECTION INTRAMUSCULAR; INTRAVENOUS EVERY 6 HOURS PRN
Status: DISCONTINUED | OUTPATIENT
Start: 2024-08-07 | End: 2024-08-07

## 2024-08-07 RX ORDER — ACETAMINOPHEN 325 MG/1
650 TABLET ORAL EVERY 6 HOURS PRN
COMMUNITY

## 2024-08-07 RX ORDER — HYDROMORPHONE HYDROCHLORIDE 1 MG/ML
0.3 INJECTION, SOLUTION INTRAMUSCULAR; INTRAVENOUS; SUBCUTANEOUS
Status: DISCONTINUED | OUTPATIENT
Start: 2024-08-07 | End: 2024-08-10 | Stop reason: HOSPADM

## 2024-08-07 RX ORDER — MELATONIN 3 MG
1 TABLET ORAL
COMMUNITY

## 2024-08-07 RX ORDER — ONDANSETRON 2 MG/ML
4 INJECTION INTRAMUSCULAR; INTRAVENOUS ONCE
Status: COMPLETED | OUTPATIENT
Start: 2024-08-07 | End: 2024-08-07

## 2024-08-07 RX ORDER — NAPROXEN 250 MG/1
500 TABLET ORAL DAILY PRN
COMMUNITY

## 2024-08-07 RX ORDER — KETOROLAC TROMETHAMINE 15 MG/ML
15 INJECTION, SOLUTION INTRAMUSCULAR; INTRAVENOUS ONCE
Status: COMPLETED | OUTPATIENT
Start: 2024-08-07 | End: 2024-08-07

## 2024-08-07 RX ORDER — NALOXONE HYDROCHLORIDE 0.4 MG/ML
0.2 INJECTION, SOLUTION INTRAMUSCULAR; INTRAVENOUS; SUBCUTANEOUS
Status: DISCONTINUED | OUTPATIENT
Start: 2024-08-07 | End: 2024-08-10

## 2024-08-07 RX ORDER — SODIUM CHLORIDE, SODIUM LACTATE, POTASSIUM CHLORIDE, CALCIUM CHLORIDE 600; 310; 30; 20 MG/100ML; MG/100ML; MG/100ML; MG/100ML
INJECTION, SOLUTION INTRAVENOUS CONTINUOUS
Status: DISCONTINUED | OUTPATIENT
Start: 2024-08-07 | End: 2024-08-07 | Stop reason: ALTCHOICE

## 2024-08-07 RX ORDER — LIDOCAINE 40 MG/G
CREAM TOPICAL
Status: DISCONTINUED | OUTPATIENT
Start: 2024-08-07 | End: 2024-08-10 | Stop reason: HOSPADM

## 2024-08-07 RX ORDER — PROCHLORPERAZINE MALEATE 10 MG
10 TABLET ORAL EVERY 6 HOURS PRN
Status: DISCONTINUED | OUTPATIENT
Start: 2024-08-07 | End: 2024-08-10 | Stop reason: HOSPADM

## 2024-08-07 RX ORDER — KETOROLAC TROMETHAMINE 15 MG/ML
30 INJECTION, SOLUTION INTRAMUSCULAR; INTRAVENOUS EVERY 6 HOURS PRN
Status: DISPENSED | OUTPATIENT
Start: 2024-08-07 | End: 2024-08-08

## 2024-08-07 RX ORDER — CELECOXIB 200 MG/1
200 CAPSULE ORAL DAILY PRN
COMMUNITY

## 2024-08-07 RX ORDER — METRONIDAZOLE 500 MG/100ML
500 INJECTION, SOLUTION INTRAVENOUS EVERY 12 HOURS
Status: DISCONTINUED | OUTPATIENT
Start: 2024-08-07 | End: 2024-08-08

## 2024-08-07 RX ORDER — CIPROFLOXACIN 2 MG/ML
400 INJECTION, SOLUTION INTRAVENOUS EVERY 12 HOURS
Status: DISCONTINUED | OUTPATIENT
Start: 2024-08-07 | End: 2024-08-08

## 2024-08-07 RX ORDER — ACETAMINOPHEN 325 MG/1
650 TABLET ORAL EVERY 4 HOURS PRN
Status: DISCONTINUED | OUTPATIENT
Start: 2024-08-07 | End: 2024-08-10 | Stop reason: HOSPADM

## 2024-08-07 RX ADMIN — SODIUM CHLORIDE 1000 ML: 9 INJECTION, SOLUTION INTRAVENOUS at 11:42

## 2024-08-07 RX ADMIN — ACETAMINOPHEN 650 MG: 325 TABLET, FILM COATED ORAL at 20:11

## 2024-08-07 RX ADMIN — METRONIDAZOLE 500 MG: 500 INJECTION, SOLUTION INTRAVENOUS at 14:19

## 2024-08-07 RX ADMIN — SODIUM CHLORIDE, POTASSIUM CHLORIDE, SODIUM LACTATE AND CALCIUM CHLORIDE: 600; 310; 30; 20 INJECTION, SOLUTION INTRAVENOUS at 14:19

## 2024-08-07 RX ADMIN — ONDANSETRON 4 MG: 2 INJECTION INTRAMUSCULAR; INTRAVENOUS at 23:50

## 2024-08-07 RX ADMIN — ONDANSETRON 4 MG: 2 INJECTION INTRAMUSCULAR; INTRAVENOUS at 11:42

## 2024-08-07 RX ADMIN — MORPHINE SULFATE 4 MG: 4 INJECTION, SOLUTION INTRAMUSCULAR; INTRAVENOUS at 12:43

## 2024-08-07 RX ADMIN — IOPAMIDOL 71 ML: 755 INJECTION, SOLUTION INTRAVENOUS at 11:26

## 2024-08-07 RX ADMIN — HYDROMORPHONE HYDROCHLORIDE 0.5 MG: 1 INJECTION, SOLUTION INTRAMUSCULAR; INTRAVENOUS; SUBCUTANEOUS at 18:10

## 2024-08-07 RX ADMIN — KETOROLAC TROMETHAMINE 15 MG: 15 INJECTION, SOLUTION INTRAMUSCULAR; INTRAVENOUS at 12:43

## 2024-08-07 RX ADMIN — MORPHINE SULFATE 4 MG: 4 INJECTION, SOLUTION INTRAMUSCULAR; INTRAVENOUS at 11:42

## 2024-08-07 RX ADMIN — KETOROLAC TROMETHAMINE 30 MG: 15 INJECTION, SOLUTION INTRAMUSCULAR; INTRAVENOUS at 20:10

## 2024-08-07 RX ADMIN — HYDROMORPHONE HYDROCHLORIDE 0.5 MG: 1 INJECTION, SOLUTION INTRAMUSCULAR; INTRAVENOUS; SUBCUTANEOUS at 23:27

## 2024-08-07 RX ADMIN — ACETAMINOPHEN 650 MG: 325 TABLET, FILM COATED ORAL at 14:53

## 2024-08-07 RX ADMIN — ONDANSETRON 4 MG: 2 INJECTION INTRAMUSCULAR; INTRAVENOUS at 18:11

## 2024-08-07 RX ADMIN — SODIUM CHLORIDE, POTASSIUM CHLORIDE, SODIUM LACTATE AND CALCIUM CHLORIDE: 600; 310; 30; 20 INJECTION, SOLUTION INTRAVENOUS at 17:05

## 2024-08-07 RX ADMIN — CIPROFLOXACIN 400 MG: 400 INJECTION, SOLUTION INTRAVENOUS at 15:20

## 2024-08-07 ASSESSMENT — ACTIVITIES OF DAILY LIVING (ADL)
ADLS_ACUITY_SCORE: 19
ADLS_ACUITY_SCORE: 35
ADLS_ACUITY_SCORE: 19
ADLS_ACUITY_SCORE: 19
ADLS_ACUITY_SCORE: 18
ADLS_ACUITY_SCORE: 19
ADLS_ACUITY_SCORE: 35
ADLS_ACUITY_SCORE: 35
ADLS_ACUITY_SCORE: 19
ADLS_ACUITY_SCORE: 35
ADLS_ACUITY_SCORE: 19
ADLS_ACUITY_SCORE: 35
ADLS_ACUITY_SCORE: 35

## 2024-08-07 NOTE — ED NOTES
[facility]  ED Nurse Handoff Report    ED Chief complaint: Abdominal Pain      ED Diagnosis:   Final diagnoses:   Pancolitis (H)   Liver lesion   Adrenal nodule (H24)   Pulmonary nodule       Code Status: Full Code    Allergies: No Known Allergies    Patient Story: Janet Gibson is a 63 year old female who presents with approximately 2 days of worsening general abdominal pain. Patient explains her pain is diffuse but sharp around the lower abdomen beginning last night. Notes accompanying diarrhea and distension for 2 days, a fever yesterday and chills last night. Took Tylenol last night that seemed to alleviate her chills. Denies vomiting, abdominal bruising, urinary issues or recent tobacco or alcohol use. No history of kidney problems, heart problems, atrial fibrillation or routine medications.    Focused Assessment:  abdominal pain    Treatments and/or interventions provided:   Medications   Saline Flush ( Intravenous Canceled Entry 8/7/24 1142)   morphine (PF) injection 4 mg (4 mg Intravenous $Given 8/7/24 1142)   ondansetron (ZOFRAN) injection 4 mg (4 mg Intravenous $Given 8/7/24 1142)   sodium chloride 0.9% BOLUS 1,000 mL (1,000 mLs Intravenous $New Bag 8/7/24 1142)   iopamidol (ISOVUE-370) solution 71 mL (71 mLs Intravenous $Given 8/7/24 1126)   morphine (PF) injection 4 mg (4 mg Intravenous $Given 8/7/24 1243)   ketorolac (TORADOL) injection 15 mg (15 mg Intravenous $Given 8/7/24 1243)      Abnormal Labs Resulted from Time of ED Arrival to Time of ED Departure   COMPREHENSIVE METABOLIC PANEL - Abnormal       Result Value    Sodium 135      Potassium 3.9      Carbon Dioxide (CO2) 26      Anion Gap 10      Urea Nitrogen 9.1      Creatinine 0.85      GFR Estimate 77      Calcium 9.2      Chloride 99      Glucose 122 (*)     Alkaline Phosphatase 92      AST 53 (*)     ALT 44      Protein Total 6.4      Albumin 4.1      Bilirubin Total 0.5     ROUTINE UA WITH MICROSCOPIC REFLEX TO CULTURE -  Abnormal    Color Urine Yellow      Appearance Urine Clear      Glucose Urine Negative      Bilirubin Urine Negative      Ketones Urine 20 (*)     Specific Gravity Urine 1.010      Blood Urine Small (*)     pH Urine 7.5 (*)     Protein Albumin Urine 30 (*)     Urobilinogen Urine Normal      Nitrite Urine Negative      Leukocyte Esterase Urine Small (*)     Bacteria Urine Few (*)     RBC Urine 17 (*)     WBC Urine 4      Squamous Epithelials Urine 13 (*)    CBC WITH PLATELETS AND DIFFERENTIAL - Abnormal    WBC Count 6.4      RBC Count 4.13      Hemoglobin 12.7      Hematocrit 38.4      MCV 93      MCH 30.8      MCHC 33.1      RDW 12.0      Platelet Count 121 (*)     % Neutrophils 86      % Lymphocytes 7      % Monocytes 6      % Eosinophils 0      % Basophils 0      % Immature Granulocytes 0      NRBCs per 100 WBC 0      Absolute Neutrophils 5.5      Absolute Lymphocytes 0.5 (*)     Absolute Monocytes 0.4      Absolute Eosinophils 0.0      Absolute Basophils 0.0      Absolute Immature Granulocytes 0.0      Absolute NRBCs 0.0        CT Abdomen Pelvis w Contrast   Final Result   IMPRESSION:    1.  Acute pancolitis, most severe in the ascending colon and   transverse colon. This may be infectious or inflammatory.   2.  Indeterminate 2.7 x 2.0 cm lesion in the central liver, possibly a   cyst or hemangioma. Consider nonemergent follow-up liver MRI.   3.  Indeterminate 1.8 x 1.6 cm right adrenal nodule. Attention on MRI.   4.  A 4 mm nodule in the right middle lobe. Consider follow-up chest   CT in 12 months.      DELIA MOTA MD            SYSTEM ID:  [id number]         Patient's response to treatments and/or interventions: pain still 8/10    To be done/followed up on inpatient unit:  C diff sample    Does this patient have any cognitive concerns?:  na    Activity level - Baseline/Home:  Independent  Activity Level - Current:   Independent    Patient's Preferred language: English   Needed?: No    Isolation:  Enteric  Infection: Not Applicable  C-Diff Pending  Patient tested for COVID 19 prior to admission: NO  Bariatric?: No    Vital Signs:   Vitals:    08/07/24 1051   BP: 110/69   Pulse: 91   Resp: 16   Temp: 99.4  F (37.4  C)   TempSrc: Temporal   SpO2: 98%       Cardiac Rhythm:     Was the PSS-3 completed:   Yes  What interventions are required if any?               Family Comments: na  OBS brochure/video discussed/provided to patient/family: Yes              Name of person given brochure if not patient: na              Relationship to patient: na    For the majority of the shift this patient's behavior was Green.   Behavioral interventions performed were na.    ED NURSE PHONE NUMBER:

## 2024-08-07 NOTE — ED PROVIDER NOTES
Emergency Department Note      History of Present Illness     Chief Complaint   Abdominal Pain      HPI   Janet Gibson is a 63 year old female who presents with approximately 2 days of worsening general abdominal pain. Patient explains her pain is diffuse but sharp around the lower abdomen beginning last night. Notes accompanying diarrhea and abdominal distension for 2 days, a fever yesterday and chills last night. Took Tylenol last night that seemed to alleviate her chills. Denies vomiting, urinary issues or recent tobacco or alcohol use. No history of kidney problems, heart problems, atrial fibrillation or routine medications.      Independent Historian   None    Review of External Notes   none    Past Medical History     Medical History and Problem List   Cancer  Colon polyp     Medications   The patient is currently on no regular medications.    Surgical History   Right mastectomy and replacement  Colonoscopy x 2  Ovarian cyst removed    Physical Exam     Patient Vitals for the past 24 hrs:   BP Temp Temp src Pulse Resp SpO2   08/07/24 1051 110/69 99.4  F (37.4  C) Temporal 91 16 98 %     Physical Exam  GENERAL: Patient well-appearing  HEAD: Atraumatic.  NECK: No rigidity  CV: RRR, no murmurs, rubs or gallops  PULM: CTAB with good aeration; no retractions, rales, rhonchi, or wheezing  ABD: General abdominal tenderness but more on the lower abdomen. Percussion tenderness of lower abdomen. No rigidity.   DERM: No rash. Skin warm and dry  EXTREMITY: Moving all extremities without difficulty. No calf tenderness or peripheral edema  VASCULAR: Symmetric pulses bilaterally    Diagnostics     Lab Results   Labs Ordered and Resulted from Time of ED Arrival to Time of ED Departure   COMPREHENSIVE METABOLIC PANEL - Abnormal       Result Value    Sodium 135      Potassium 3.9      Carbon Dioxide (CO2) 26      Anion Gap 10      Urea Nitrogen 9.1      Creatinine 0.85      GFR Estimate 77      Calcium 9.2      Chloride  99      Glucose 122 (*)     Alkaline Phosphatase 92      AST 53 (*)     ALT 44      Protein Total 6.4      Albumin 4.1      Bilirubin Total 0.5     ROUTINE UA WITH MICROSCOPIC REFLEX TO CULTURE - Abnormal    Color Urine Yellow      Appearance Urine Clear      Glucose Urine Negative      Bilirubin Urine Negative      Ketones Urine 20 (*)     Specific Gravity Urine 1.010      Blood Urine Small (*)     pH Urine 7.5 (*)     Protein Albumin Urine 30 (*)     Urobilinogen Urine Normal      Nitrite Urine Negative      Leukocyte Esterase Urine Small (*)     Bacteria Urine Few (*)     RBC Urine 17 (*)     WBC Urine 4      Squamous Epithelials Urine 13 (*)    CBC WITH PLATELETS AND DIFFERENTIAL - Abnormal    WBC Count 6.4      RBC Count 4.13      Hemoglobin 12.7      Hematocrit 38.4      MCV 93      MCH 30.8      MCHC 33.1      RDW 12.0      Platelet Count 121 (*)     % Neutrophils 86      % Lymphocytes 7      % Monocytes 6      % Eosinophils 0      % Basophils 0      % Immature Granulocytes 0      NRBCs per 100 WBC 0      Absolute Neutrophils 5.5      Absolute Lymphocytes 0.5 (*)     Absolute Monocytes 0.4      Absolute Eosinophils 0.0      Absolute Basophils 0.0      Absolute Immature Granulocytes 0.0      Absolute NRBCs 0.0     LIPASE - Normal    Lipase 14     LACTIC ACID WHOLE BLOOD - Normal    Lactic Acid 0.8     ENTERIC BACTERIA AND VIRUS PANEL BY PCR   C. DIFFICILE TOXIN B PCR WITH REFLEX TO C. DIFFICILE ANTIGEN AND TOXINS A/B EIA     Imaging   CT Abdomen Pelvis w Contrast   Final Result   IMPRESSION:    1.  Acute pancolitis, most severe in the ascending colon and   transverse colon. This may be infectious or inflammatory.   2.  Indeterminate 2.7 x 2.0 cm lesion in the central liver, possibly a   cyst or hemangioma. Consider nonemergent follow-up liver MRI.   3.  Indeterminate 1.8 x 1.6 cm right adrenal nodule. Attention on MRI.   4.  A 4 mm nodule in the right middle lobe. Consider follow-up chest   CT in 12 months.       DELIA MOTA MD            SYSTEM ID:  [id number]        Independent Interpretation   None    ED Course      Medications Administered   Medications   Saline Flush ( Intravenous Canceled Entry 8/7/24 1142)   morphine (PF) injection 4 mg (4 mg Intravenous $Given 8/7/24 1142)   ondansetron (ZOFRAN) injection 4 mg (4 mg Intravenous $Given 8/7/24 1142)   sodium chloride 0.9% BOLUS 1,000 mL (1,000 mLs Intravenous $New Bag 8/7/24 1142)   iopamidol (ISOVUE-370) solution 71 mL (71 mLs Intravenous $Given 8/7/24 1126)   morphine (PF) injection 4 mg (4 mg Intravenous $Given 8/7/24 1243)   ketorolac (TORADOL) injection 15 mg (15 mg Intravenous $Given 8/7/24 1243)     Procedures   Procedures     Discussion of Management   Admitting Hospitalist, Dr. Ulloa.    ED Course   ED Course as of 08/07/24 1248   Wed Aug 07, 2024   1103 I obtained history and examined the patient as noted above.     1227 I rechecked and updated the patient.     1247 I spoke with Dr. Ulloa, of the hospitalist team, regarding the patient. They will accept the patient for admission.       Additional Documentation  None    Medical Decision Making / Diagnosis     CMS Diagnoses: None    MIPS       None    MDM   Janet Gibson is a 63 year old female     Symptoms most consistent with pancolitis.  Differential diagnosis-considered intra-abdominal infection, metabolic abnormality, sepsis, among others.  Vital signs unremarkable.  Lab significant for mild thrombocytopenia 121.  No leukocytosis.  Lactic within normal limits.  CMP only notable for slight elevation of AST that does not require acute invention.  UA with small mount of blood but contaminated sample.  CT scan demonstrating colitis.  Do not suspect ischemic colitis.  Ordered C. difficile and stool cultures.  Do not think patient requires acute surgery.  Given IV morphine, Zofran, and Toradol for pain control.  Patient does not appear septic.  Lactate and white blood cell count within normal meds.   Therefore we will hold on IV antibiotics and will defer to inpatient team.  Discussed incidental findings including pulmonary nodule, liver lesion, and adrenal nodule and need for follow-up with outpatient imaging.    Disposition   The patient was admitted to the hospital.     Diagnosis     ICD-10-CM    1. Pancolitis (H)  K51.00       2. Liver lesion  K76.9       3. Adrenal nodule (H24)  E27.8       4. Pulmonary nodule  R91.1              Scribe Disclosure:  Tiffany CLANCY, am serving as a scribe at 11:10 AM on 8/7/2024 to document services personally performed by Jason Bourgeois MD based on my observations and the provider's statements to me.        Jason Bouregois MD  08/07/24 2238

## 2024-08-07 NOTE — CONSULTS
GASTROENTEROLOGY CONSULTATION     Janet Gibson  [address] [facility] [place] [address]  63 year old female    Admission Date/Time: 8/7/2024  Primary Care Provider: Lore Meraz  Referring / Attending Physician:  Bk        Source of information: Pt and chart    PAST MEDICAL HISTORY:  Patient Active Problem List    Diagnosis Date Noted    Pancolitis (H) 08/07/2024     Priority: Medium    Adrenal nodule (H24) 08/07/2024     Priority: Medium    Pulmonary nodule 08/07/2024     Priority: Medium    Liver lesion 08/07/2024     Priority: Medium    Malignant neoplasm of central portion of right female breast (H) 04/19/2017     Priority: Medium           HPI:  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 Pt has had alternating diarrhea and constipation x 4 months. Thought she might have IBS so she added more fiber to her diet. Usually no pain. Two days ago starting having abdominal distention and lower abd pain. Has had 1 loose non bloody stool per day x 3 days. Felt feverish with chills but did not take temp. No URI, pulmonary or CV symptoms. Denies travel, camping, ill contacts or recent antibiotic use. WBC nl, CT showed acute pan colitis. Last colonoscopy 2020 negative. Father had colon cancer in late 50's. Has  chronic joint pain but no changes.      ROS: A comprehensive ten point review of systems was negative aside from those in mentioned in the HPI.      MEDICATIONS:   Current Outpatient Medications   Medication Sig Dispense Refill    acetaminophen (TYLENOL) 325 MG tablet Take 650 mg by mouth every 6 hours as needed for mild pain      celecoxib (CELEBREX) 200 MG capsule Take 200 mg by mouth daily as needed for moderate pain      melatonin 1.5 mg half-tab Take 1 half-tab by mouth nightly as needed for sleep      naproxen (NAPROSYN) 250 MG tablet Take 500 mg by mouth daily as needed for moderate pain         ALLERGIES: No Known Allergies    SOCIAL  HISTORY:  Social History     Tobacco Use    Smoking status: Never    Smokeless tobacco: Never   Substance Use Topics    Alcohol use: Yes     Comment: 2 week    Drug use: No       FAMILY HISTORY:  Family History   Problem Relation Age of Onset    Cancer - colorectal Father 59    Hypertension Father     Hyperlipidemia Father     Hypertension Mother     Anesthesia Reaction Mother     Lymphoma Brother        PHYSICAL EXAM:   General  awake alert NAD  /69   Pulse 91   Temp 99.4  F (37.4  C) (Temporal)   Resp 16   SpO2 98%     PHYSICAL EXAM:  Constitutional: healthy, alert, and no distress   Cardiovascular: negative  Respiratory: negative  Psychiatric: mentation appears normal  Head: Normocephalic. No masses, lesions, tenderness or abnormalities  Neck: Neck supple. No adenopathy. Thyroid symmetric, normal size,  Abdomen: Abdomen soft, with rebound and guarding. BS normal. No masses, organomegaly  NEURO: negative  SKIN: no suspicious lesions or rashes  JOINT/EXTREMITIES: extremities normal- no gross deformities noted and normal muscle tone          ADDITIONAL COMMENTS:   I reviewed the patient's new clinical lab test results.   Recent Labs   Lab Test 08/07/24  1057   WBC 6.4   HGB 12.7   MCV 93   *     Recent Labs   Lab Test 08/07/24  1057   POTASSIUM 3.9   CHLORIDE 99   CO2 26   BUN 9.1   ANIONGAP 10     Recent Labs   Lab Test 08/07/24  1144 08/07/24  1057   ALBUMIN  --  4.1   BILITOTAL  --  0.5   ALT  --  44   AST  --  53*   PROTEIN 30*  --    LIPASE  --  14       I reviewed the patient's new imaging results.        CONSULTATION ASSESSMENT AND PLAN:    Active Problems:    Pancolitis (H)    Assessment:Likely infectious colitis.   -Await stool studies.   -Start Cipro/Flagyl given rebound and guarding on exam. Can be altered or discontinued based on stool testing  -NPO excepts sips/ice chips  -No urgent plans for endoscopic procedure  -Surgery consult if abdominal exam worsens  -Will need outpt colon in  near future given chronic alternating diarrhea and constipation.   -IVF    Total time 30 min            Trinidad Steen MD  [facility]  Pager: [phone]  Office: [phone]

## 2024-08-07 NOTE — PHARMACY-ADMISSION MEDICATION HISTORY
Pharmacist Admission Medication History    Admission medication history is complete. The information provided in this note is only as accurate as the sources available at the time of the update.    Information Source(s): Patient and CareEverywhere/SureScripts via in-person    Pertinent Information: None    Changes made to PTA medication list:  Added: All medications  Deleted: None  Changed: None    Allergies reviewed with patient and updates made in EHR: yes    Medication History Completed By: Cherry Bellamy RPH 8/7/2024 1:37 PM    PTA Med List   Medication Sig Last Dose    acetaminophen (TYLENOL) 325 MG tablet Take 650 mg by mouth every 6 hours as needed for mild pain 8/7/2024    celecoxib (CELEBREX) 200 MG capsule Take 200 mg by mouth daily as needed for moderate pain Past Week    melatonin 1.5 mg half-tab Take 1 half-tab by mouth nightly as needed for sleep Past Week    naproxen (NAPROSYN) 250 MG tablet Take 500 mg by mouth daily as needed for moderate pain Past Week

## 2024-08-07 NOTE — PROGRESS NOTES
Observation goals  PRIOR TO DISCHARGE        Comments: -diagnostic tests and consults completed and resulted not met   -vital signs normal or at patient baseline met   -tolerating oral intake to maintain hydration not met   -adequate pain control on oral analgesics not met   -returns to baseline functional status not met   Nurse to notify provider when observation goals have been met and patient is ready for discharge.

## 2024-08-07 NOTE — H&P
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  CT abdomen/pelvis in the ER showed findings consistent with pancolitis.  She was given IV fluids and pain medications and the hospitalist service was contacted to bring her into the hospital for observation.    Pancolitis  Abdominal pain  Elevated AST  Several months of alternating diarrhea/constipation.  No improvement with adjustment of her diet.  Developed abdominal pain and distention 2-3 days prior to presentation.  Having about one loose stool per day over the last 3 days.  No nausea or vomiting.  Fever/chills the night prior to admission.  CT abdomen/pelvis showed pancolitis.  Temp 99.4.  WBC within normal limits.  AST 53, other LFTs within normal limits.  [place] to observation. If not significantly improved tomorrow, consider inpatient admission.  GI consult, appreciate their assistance.  Stool studies ordered in the ER, awaiting BM.  NPO except ice chips and sips with meds.  As needed pain and nausea medications available.  IV fluids for now until oral intake improves.  Defer antibiotics to GI.   Recheck labs in AM.    Thrombocytopenia  Platelets 121 in the ER on 8/7/2024.  Previous baseline of 183 on 10/6/2022.  Recheck CBC in AM.    Liver lesion  Right adrenal nodule  Right middle lobe lung nodule  CT abdomen/pelvis in the ER on 8/7/2024 incidentally showed an indeterminant 2.7 x 2.0 cm lesion in the central liver, indeterminant 1.8 x 1.6 cm right adrenal nodule, and for millimeter nodule in the right lung middle lobe.  Recommend nonemergent follow-up liver MRI to evaluate the liver lesion and adrenal nodule.  Recommend follow-up CT chest in 12 months.    History of stage IA breast cancer  Diagnosed in 2017 and  underwent mastectomy. No chemotherapy or radiation was recommended. She took tamoxifen and then letrozole post-operatively, both were stopped due to side effects.  Noted.          Diet:  clear liquid  DVT Prophylaxis: Pneumatic Compression Devices  Reddy Catheter: Not present  Lines: None     Cardiac Monitoring: None  Code Status:  FULL CODE    Clinically Significant Risk Factors Present on Admission                # Thrombocytopenia: Lowest platelets = 121 in last 2 days, will monitor for bleeding       # Acute Hypoxic Respiratory Failure: Documented O2 saturation < 90%. 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  She noticed a change in her bowel habits about 4 months ago, started alternating between diarrhea and constipation.  She adjusted her diet without any change in symptoms. About 2-3 days ago she developed pain and bloating in her abdomen.  No nausea or vomiting.  Has had about one loose stool per day over the past 3 days.  No melena or hematochezia.  Last night she felt chilled and felt like she had a fever.  No chest pain, palpitations, lightheadedness, shortness of breath, cough, urinary symptoms.  No recent changes to her medications - takes PRN aleve and celebrex (joint pains) and melatonin (insomnia) at home, no chronic prescription medications.  No  sick contacts.  No recent travel.  No antibiotics recently.  Due to the ongoing symptoms she presented to the ER today for evaluation.    In the ER, she was evaluated by Dr. Bourgeois.  Vitals OK as documented in Epic, temp 99.4.  Labs with slightly elevated AST and slightly low platelets, otherwise unremarkable.  CT abdomen/pelvis showed acute pan colitis and multiple incidental nodules/lesions - see report for details.  She was given IV fluids and pain medications.  She became nauseous after the CT scan and was given zofran.  The hospitalist service was contacted to bring her into the hospital for further evaluation and management.    No prior history of colitis.  Last colonoscopy was in 2020 and was reported as normal.  Scheduled to have another colonoscopy in 2025.  Her father had colon cancer, diagnosed at age 59.  She has a personal history of stage 1A breast cancer, treated with mastectomy and sentinel lymph node biopsy.  Received tamoxifen and then letrozole post-operatively but both were discontinued due to side effects.  Denies any other significant medical history.    Past Medical History    Past Medical History:   Diagnosis Date    Cancer (H)     breast cancer treated with surgery only    Colon polyp      Past Surgical History   Past Surgical History:   Procedure Laterality Date    ANKLE SURGERY      BREAST SURGERY      right mastectomy and replacement    COLONOSCOPY      COLONOSCOPY N/A 03/24/2015    Procedure: COLONOSCOPY;  Surgeon: Karely Junior MD;  Location:  GI    COLONOSCOPY N/A 06/23/2020    Procedure: COLONOSCOPY;  Surgeon: Sebastián Gee MD;  Location:  GI    GYN SURGERY      ovarian cyst removed     Prior to Admission Medications   None        Review of Systems    The 10 point Review of Systems is negative other than noted in the HPI or here.    Social History   I have reviewed this patient's social history and updated it with pertinent information if needed.  Social History      Tobacco Use    Smoking status: Never    Smokeless tobacco: Never   Substance Use Topics    Alcohol use: Yes     Comment: 2 week    Drug use: No     Family History   I have reviewed this patient's family history and updated it with pertinent information if needed.  Family History   Problem Relation Age of Onset    Cancer - colorectal Father 59    Hypertension Father     Hyperlipidemia Father     Hypertension Mother     Anesthesia Reaction Mother     Lymphoma Brother      Allergies   No Known Allergies     Physical Exam   Vital Signs: Temp: 99.4  F (37.4  C) Temp src: Temporal BP: 110/69 Pulse: 91   Resp: 16 SpO2: 98 % O2 Device: None (Room air)    Weight: 0 lbs 0 oz    Constitutional: awake, alert, cooperative, no apparent distress, laying in the ER bed  Respiratory: no increased work of breathing, clear to auscultation bilaterally, no crackles or wheezing  Cardiovascular: regular rate and rhythm, normal S1 and S2, no murmur noted  GI: normal bowel sounds, soft, distended, generalized tenderness worse across the lower abdomen  Skin: warm, dry  Musculoskeletal: no lower extremity pitting edema present  Neurologic: awake, alert, oriented, moves all extremities    Medical Decision Making       70 MINUTES SPENT BY ME on the date of service doing chart review, history, exam, documentation & further activities per the note.      Data     I have personally reviewed the following data over the past 24 hrs:    6.4  \   12.7   / 121 (L)     135 99 9.1 /  122 (H)   3.9 26 0.85 \     ALT: 44 AST: 53 (H) AP: 92 TBILI: 0.5   ALB: 4.1 TOT PROTEIN: 6.4 LIPASE: 14     Procal: N/A CRP: N/A Lactic Acid: 0.8         Imaging results reviewed over the past 24 hrs:   Recent Results (from the past 24 hour(s))   CT Abdomen Pelvis w Contrast    Narrative    CT ABDOMEN PELVIS W CONTRAST 8/7/2024 11:26 AM    CLINICAL HISTORY: diffuse abd pain    TECHNIQUE: CT scan of the abdomen and pelvis was performed following  injection of IV contrast.  Multiplanar reformats were obtained. Dose  reduction techniques were used.  CONTRAST: 71 mL Isovue-[id number]    COMPARISON: None.    FINDINGS:   LOWER CHEST: A 4 mm nodule in the right middle lobe.    HEPATOBILIARY: Indeterminate hypodense lesion in the central liver  measuring 2.7 x 2.0 cm (series 4, image 34). No calcified gallstones  or biliary ductal dilatation.    PANCREAS: Normal.    SPLEEN: Normal.    ADRENAL GLANDS: A 1.8 x 1.6 cm nodule in the right adrenal gland,  indeterminate.    KIDNEYS/BLADDER: Normal.    BOWEL: Marked circumferential wall thickening involving the ascending  colon, transverse colon, and to a lesser extent, the descending colon  and sigmoid colon with surrounding fat stranding, consistent with  pancolitis. No small bowel or colonic obstruction. Normal appendix.    PELVIC ORGANS: No pelvic masses.    ADDITIONAL FINDINGS: No lymphadenopathy in the abdomen and pelvis. No  abdominal aortic aneurysm. Small amount of free fluid in the pelvis.  No fluid collections or free air.    MUSCULOSKELETAL: Unremarkable.      Impression    IMPRESSION:   1.  Acute pancolitis, most severe in the ascending colon and  transverse colon. This may be infectious or inflammatory.  2.  Indeterminate 2.7 x 2.0 cm lesion in the central liver, possibly a  cyst or hemangioma. Consider nonemergent follow-up liver MRI.  3.  Indeterminate 1.8 x 1.6 cm right adrenal nodule. Attention on MRI.  4.  A 4 mm nodule in the right middle lobe. Consider follow-up chest  CT in 12 months.    DELIA MOTA MD         SYSTEM ID:  [id number]

## 2024-08-07 NOTE — ED TRIAGE NOTES
Pt reports generalized abdominal pain and bloating on Monday afternoon. Pt reports episodes of diarrhea. Having rebound tenderness on the right side. Still has appendix and Gallbladder. Unable to ambulate due to pain. 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      Triage Assessment (Adult)       Row Name 08/07/24 1048          Triage Assessment    Airway WDL WDL        Respiratory WDL    Rhythm/Pattern, Respiratory unlabored;pattern regular;depth regular

## 2024-08-08 PROBLEM — D69.6 THROMBOCYTOPENIA (H): Status: ACTIVE | Noted: 2024-08-08

## 2024-08-08 LAB
ALBUMIN SERPL BCG-MCNC: 3.4 G/DL (ref 3.5–5.2)
ALP SERPL-CCNC: 73 U/L (ref 40–150)
ALT SERPL W P-5'-P-CCNC: 29 U/L (ref 0–50)
ANION GAP SERPL CALCULATED.3IONS-SCNC: 10 MMOL/L (ref 7–15)
AST SERPL W P-5'-P-CCNC: 27 U/L (ref 0–45)
BILIRUB SERPL-MCNC: 0.3 MG/DL
BUN SERPL-MCNC: 10.4 MG/DL (ref 8–23)
CALCIUM SERPL-MCNC: 8.7 MG/DL (ref 8.8–10.4)
CHLORIDE SERPL-SCNC: 103 MMOL/L (ref 98–107)
CREAT SERPL-MCNC: 0.81 MG/DL (ref 0.51–0.95)
EGFRCR SERPLBLD CKD-EPI 2021: 81 ML/MIN/1.73M2
ERYTHROCYTE [DISTWIDTH] IN BLOOD BY AUTOMATED COUNT: 12.1 % (ref 10–15)
GLUCOSE SERPL-MCNC: 91 MG/DL (ref 70–99)
HCO3 SERPL-SCNC: 23 MMOL/L (ref 22–29)
HCT VFR BLD AUTO: 31.4 % (ref 35–47)
HGB BLD-MCNC: 10.4 G/DL (ref 11.7–15.7)
MCH RBC QN AUTO: 30.6 PG (ref 26.5–33)
MCHC RBC AUTO-ENTMCNC: 33.1 G/DL (ref 31.5–36.5)
MCV RBC AUTO: 92 FL (ref 78–100)
PLATELET # BLD AUTO: 101 10E3/UL (ref 150–450)
POTASSIUM SERPL-SCNC: 3.8 MMOL/L (ref 3.4–5.3)
PROT SERPL-MCNC: 5.5 G/DL (ref 6.4–8.3)
RBC # BLD AUTO: 3.4 10E6/UL (ref 3.8–5.2)
SODIUM SERPL-SCNC: 136 MMOL/L (ref 135–145)
WBC # BLD AUTO: 3.9 10E3/UL (ref 4–11)

## 2024-08-08 PROCEDURE — 96376 TX/PRO/DX INJ SAME DRUG ADON: CPT

## 2024-08-08 PROCEDURE — 250N000011 HC RX IP 250 OP 636: Performed by: HOSPITALIST

## 2024-08-08 PROCEDURE — 99232 SBSQ HOSP IP/OBS MODERATE 35: CPT | Performed by: HOSPITALIST

## 2024-08-08 PROCEDURE — 250N000013 HC RX MED GY IP 250 OP 250 PS 637: Performed by: INTERNAL MEDICINE

## 2024-08-08 PROCEDURE — 258N000003 HC RX IP 258 OP 636: Performed by: HOSPITALIST

## 2024-08-08 PROCEDURE — 80053 COMPREHEN METABOLIC PANEL: CPT | Performed by: HOSPITALIST

## 2024-08-08 PROCEDURE — 250N000013 HC RX MED GY IP 250 OP 250 PS 637: Performed by: HOSPITALIST

## 2024-08-08 PROCEDURE — 36415 COLL VENOUS BLD VENIPUNCTURE: CPT | Performed by: HOSPITALIST

## 2024-08-08 PROCEDURE — 85027 COMPLETE CBC AUTOMATED: CPT | Performed by: HOSPITALIST

## 2024-08-08 PROCEDURE — 250N000011 HC RX IP 250 OP 636: Performed by: INTERNAL MEDICINE

## 2024-08-08 PROCEDURE — G0378 HOSPITAL OBSERVATION PER HR: HCPCS

## 2024-08-08 PROCEDURE — 120N000001 HC R&B MED SURG/OB

## 2024-08-08 RX ORDER — AZITHROMYCIN 250 MG/1
500 TABLET, FILM COATED ORAL DAILY
Status: DISCONTINUED | OUTPATIENT
Start: 2024-08-08 | End: 2024-08-08

## 2024-08-08 RX ORDER — AZITHROMYCIN 250 MG/1
500 TABLET, FILM COATED ORAL EVERY 24 HOURS
Status: COMPLETED | OUTPATIENT
Start: 2024-08-08 | End: 2024-08-10

## 2024-08-08 RX ORDER — KETOROLAC TROMETHAMINE 30 MG/ML
30 INJECTION, SOLUTION INTRAMUSCULAR; INTRAVENOUS EVERY 6 HOURS PRN
Status: COMPLETED | OUTPATIENT
Start: 2024-08-08 | End: 2024-08-09

## 2024-08-08 RX ORDER — ZOLMITRIPTAN 2.5 MG/1
2.5 TABLET, FILM COATED ORAL ONCE
Status: COMPLETED | OUTPATIENT
Start: 2024-08-08 | End: 2024-08-08

## 2024-08-08 RX ADMIN — ACETAMINOPHEN 650 MG: 325 TABLET, FILM COATED ORAL at 20:34

## 2024-08-08 RX ADMIN — SODIUM CHLORIDE, POTASSIUM CHLORIDE, SODIUM LACTATE AND CALCIUM CHLORIDE: 600; 310; 30; 20 INJECTION, SOLUTION INTRAVENOUS at 06:58

## 2024-08-08 RX ADMIN — ACETAMINOPHEN 650 MG: 325 TABLET, FILM COATED ORAL at 08:40

## 2024-08-08 RX ADMIN — KETOROLAC TROMETHAMINE 30 MG: 15 INJECTION, SOLUTION INTRAMUSCULAR; INTRAVENOUS at 14:40

## 2024-08-08 RX ADMIN — ACETAMINOPHEN 650 MG: 325 TABLET, FILM COATED ORAL at 02:42

## 2024-08-08 RX ADMIN — KETOROLAC TROMETHAMINE 30 MG: 30 INJECTION, SOLUTION INTRAMUSCULAR at 20:34

## 2024-08-08 RX ADMIN — HYDROMORPHONE HYDROCHLORIDE 0.5 MG: 1 INJECTION, SOLUTION INTRAMUSCULAR; INTRAVENOUS; SUBCUTANEOUS at 22:36

## 2024-08-08 RX ADMIN — CIPROFLOXACIN 400 MG: 400 INJECTION, SOLUTION INTRAVENOUS at 01:57

## 2024-08-08 RX ADMIN — KETOROLAC TROMETHAMINE 30 MG: 15 INJECTION, SOLUTION INTRAMUSCULAR; INTRAVENOUS at 08:40

## 2024-08-08 RX ADMIN — SODIUM CHLORIDE, POTASSIUM CHLORIDE, SODIUM LACTATE AND CALCIUM CHLORIDE: 600; 310; 30; 20 INJECTION, SOLUTION INTRAVENOUS at 18:37

## 2024-08-08 RX ADMIN — METRONIDAZOLE 500 MG: 500 INJECTION, SOLUTION INTRAVENOUS at 03:07

## 2024-08-08 RX ADMIN — AZITHROMYCIN DIHYDRATE 500 MG: 250 TABLET ORAL at 11:00

## 2024-08-08 RX ADMIN — ACETAMINOPHEN 650 MG: 325 TABLET, FILM COATED ORAL at 14:46

## 2024-08-08 RX ADMIN — KETOROLAC TROMETHAMINE 30 MG: 15 INJECTION, SOLUTION INTRAMUSCULAR; INTRAVENOUS at 02:42

## 2024-08-08 RX ADMIN — ZOLMITRIPTAN 2.5 MG: 2.5 TABLET, FILM COATED ORAL at 11:00

## 2024-08-08 ASSESSMENT — ACTIVITIES OF DAILY LIVING (ADL)
ADLS_ACUITY_SCORE: 18

## 2024-08-08 NOTE — PROGRESS NOTES
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  CT abdomen/pelvis in the ER showed findings consistent with pancolitis.  She was given IV fluids and pain medications and the hospitalist service was contacted to bring her into the hospital for observation.     Pancolitis - campylobacter  Abdominal pain  Elevated AST  Several months of alternating diarrhea/constipation.  No improvement with adjustment of her diet.  Developed abdominal pain and distention 2-3 days prior to presentation.  Having about one loose stool per day over the last 3 days.  No nausea or vomiting.  Fever/chills the night prior to admission.  CT abdomen/pelvis showed pancolitis.  Temp 99.4.  WBC within normal limits.  AST 53, other LFTs within normal limits.  [place] to observation. If not significantly improved tomorrow, consider inpatient admission.  GI consult, appreciate their assistance.  Stool studies showed + campylobacter  As needed pain and nausea medications available.  IV fluids for now until oral intake improves.  Defer antibiotics to GI -  cipro/flagyl stopped, transitioned to azithro 500 mg PO x 3 days 8/8  Clear liquids and ADAT per GI  Follow Labs     Pancytopenia, likely in part due to GI infection as above  Thrombocytopenia  Platelets 121 in the ER on 8/7/2024.  Previous baseline of 183 on 10/6/2022.  Plt 101 8/8, WBC 3.9, hgb 10.4  Follow cbc     Liver lesion  Right adrenal nodule  Right middle lobe lung nodule  CT abdomen/pelvis in the ER on 8/7/2024 incidentally showed an indeterminant 2.7 x 2.0 cm lesion in the central liver, indeterminant 1.8 x 1.6 cm right adrenal nodule, and for millimeter nodule in the right lung middle lobe.  Recommend nonemergent follow-up liver MRI to evaluate  the liver lesion and adrenal nodule.  Recommend follow-up CT chest in 12 months.     History of stage IA breast cancer  Diagnosed in 2017 and underwent mastectomy. No chemotherapy or radiation was recommended. She took tamoxifen and then letrozole post-operatively, both were stopped due to side effects.  Noted.       Observation Goals: -diagnostic tests and consults completed and resulted, -vital signs normal or at patient baseline, -tolerating oral intake to maintain hydration, -adequate pain control on oral analgesics, -returns to baseline functional status, Nurse to notify provider when observation goals have been met and patient is ready for discharge.  Diet: NPO per Anesthesia Guidelines for Procedure/Surgery Except for: Ice Chips, Meds    DVT Prophylaxis: Pneumatic Compression Devices and Ambulate every shift  Reddy Catheter: Not present  Lines: None     Cardiac Monitoring: None  Code Status: Full Code      Clinically Significant Risk Factors Present on Admission              # Hypoalbuminemia: Lowest albumin = 3.4 g/dL at 8/8/2024  6:57 AM, will monitor as appropriate   # Thrombocytopenia: Lowest platelets = 101 in last 2 days, will monitor for bleeding       # Acute Hypoxic Respiratory Failure: Documented O2 saturation < 90%. Continue supplemental oxygen as needed   # Anemia: based on hgb <11                        Disposition Plan     Medically Ready for Discharge: Anticipated in 2-4 Days, possibly tmrw pending clinical course - diet toleration etc             Nilesh oJlly MD  Hospitalist Service  [facility]  Securely message with [employer] (more info)  Text page via [facility] Paging/Directory   ______________________________________________________________________    Interval History   Care assumed today. Seen and examined. Some discomfort with coffee earlier but doing ok now.     Physical Exam   Vital Signs: Temp: 98  F (36.7  C) Temp src: Oral BP: 118/81 Pulse: 75   Resp: 16 SpO2:  98 % O2 Device: None (Room air)    Weight: 151 lbs 7.3 oz    Gen: NAD, pleasant  HEENT: EOMI, MMM  Resp: no focal crackles,  no wheezes, no increased work of resp  CV: S1S2 heard, reg rhythm, reg rate  Abdo: soft, tender on palp, nondistended, bowel sounds present  Ext: calves nontender, well perfused  Neuro: aa, conversant,moving ext, CN grossly intact, no facial asymmetry      Medical Decision Making       41 MINUTES SPENT BY ME on the date of service doing chart review, history, exam, documentation & further activities per the note.      Data     I have personally reviewed the following data over the past 24 hrs:    3.9 (L)  \   10.4 (L)   / 101 (L)     136 103 10.4 /  91   3.8 23 0.81 \     ALT: 29 AST: 27 AP: 73 TBILI: 0.3   ALB: 3.4 (L) TOT PROTEIN: 5.5 (L) LIPASE: N/A

## 2024-08-08 NOTE — PLAN OF CARE
Goal Outcome Evaluation:      Plan of Care Reviewed With: patient, spouse    Overall Patient Progress: improvingOverall Patient Progress: improving     A&O x 4. VSS, RA. CMS intact. Pain managed with tylenol and IV Toradol. Up independent in room. Voiding adequately in BR. IV infusing LR 75 ml/hr. Advanced to full liquid. Discharge pending.

## 2024-08-08 NOTE — PROGRESS NOTES
"GASTROENTEROLOGY PROGRESS NOTE      SUBJECTIVE:  Still with some pain, no BM    OBJECTIVE:  General Appearance:  Awake alert NAD  /81 (BP Location: Right arm)   Pulse 75   Temp 98  F (36.7  C) (Oral)   Resp 16   Ht 1.702 m (5' 7\")   Wt 68.7 kg (151 lb 7.3 oz)   SpO2 98%   BMI 23.72 kg/m    Temp (24hrs), Av.3  F (36.8  C), Min:97.6  F (36.4  C), Max:99.4  F (37.4  C)    Patient Vitals for the past 72 hrs:   Weight   24 0300 68.7 kg (151 lb 7.3 oz)     No intake or output data in the 24 hours ending 24 0936    PHYSICAL EXAM    Abd: S +guarding diffusely +bs        Additional Comments:  ROS, FH, SH: See initial GI consult for details.    I have reviewed the patient's new clinical lab results:    Recent Labs   Lab Test 24  0657 24  1057   WBC 3.9* 6.4   HGB 10.4* 12.7   MCV 92 93   * 121*     Recent Labs   Lab Test 24  0657 24  1057   POTASSIUM 3.8 3.9   CHLORIDE 103 99   CO2 23 26   BUN 10.4 9.1   ANIONGAP 10 10     Recent Labs   Lab Test 24  0657 24  1144 24  1057   ALBUMIN 3.4*  --  4.1   BILITOTAL 0.3  --  0.5   ALT 29  --  44   AST 27  --  53*   PROTEIN  --  30*  --    LIPASE  --   --  14         Active Problems:    Campylobacter colitis  -change antibiotics to azithromycin  -Start clears, ADAT  -Still with significant tenderness but nl VS and WBC reassuring.         Trinidad Steen MD  [facility]  Pager: [phone]  Office: [phone]  "

## 2024-08-08 NOTE — PLAN OF CARE
Observation goals  PRIOR TO DISCHARGE       Comments:   -diagnostic tests and consults completed and resulted: MET  -vital signs normal or at patient baseline: MET  -tolerating oral intake to maintain hydration: MET  -adequate pain control on oral analgesics: NOT MET, still with episodes of pain.  -returns to baseline functional status: NOT MET

## 2024-08-08 NOTE — PLAN OF CARE
Goal Outcome Evaluation:      Plan of Care Reviewed With: patient    Overall Patient Progress: improvingOverall Patient Progress: improving                Observation goals  PRIOR TO DISCHARGE       Comments: -diagnostic tests and consults completed and resulted not met   -vital signs normal or at patient baseline met   -tolerating oral intake to maintain hydration not met   -adequate pain control on oral analgesics not met   -returns to baseline functional status not met   Nurse to notify provider when observation goals have been met and patient is ready for discharge.      Summary Abdominal pain and bloating, Pancolitis    Hx Breast cancer     Orientation A & O x 4     Vitals/Tele VSS on RM     Pain managed with PRN Dilaudid, Toradol, Tylenol and Zofran for Nausea    IV Access/drains PIV infusing LR @ 75 with intermittent ABX     Diet NPO + Ice chips & Meds     Mobility Ind     GI/ Continent of B/B voiding adequately and BM X 1 which some was sent to lab    Wound/Skin R arm swelling from IV extravagation ( swelling improving)      Consults GI     Discharge Plan Pending     See Flow sheets for assessment

## 2024-08-08 NOTE — PLAN OF CARE
PRIMARY Concern: Diarrhea/Constipation for 2 mos/ Abd.pain  SAFETY RISK Concerns (fall risk, behaviors, etc.): Green    Isolation/Type: Enteric  Tests/Procedures for NEXT shift: AM labs  Consults? (Pending/following, signed-off?) GI following  Where is patient from? (Home, TCU, etc.): Home  Other Important info for NEXT shift: R Arm healing from IV site extravasation from ER  Anticipated DC date & active delays: TBD    SUMMARY NOTE:  Orientation/Cognitive: A&Ox4  Observation Goals (Met/ Not Met): Not met  Mobility Level/Assist Equipment: Ind  Antibiotics & Plan (IV/po, length of tx left): Cipro IV (not compatible to LR IVF) + Flagyl IV  Pain Management: All available PRN was given. See chart.  Tele/VS/O2: VSS on RA  ABNL Lab/BG: CT abd: Acute Pancolitis. Stool sample: (+) for Campylobacter species  Diet: NPO, except ice chips and meds  Bowel/Bladder: Cont  Skin Concerns: R Arm healing from IV site extravasation from ER  Drains/Devices: IVF LR running at 75ml/hr  Patient Stated Goal for Today: Sleep    Observation goals  PRIOR TO DISCHARGE       Comments:   -diagnostic tests and consults completed and resulted: MET  -vital signs normal or at patient baseline: MET  -tolerating oral intake to maintain hydration: MET  -adequate pain control on oral analgesics: NOT MET, still with episodes of pain.  -returns to baseline functional status: NOT MET

## 2024-08-08 NOTE — PROGRESS NOTES
Patient tested positive for Campylobacter sp. Enteric Precautions were removed since they are not required for this organism. Contact Precautions are only required for diapered or incontinent persons or to control outbreaks. Please use standard precautions and reach out to Infection Prevention for any questions. Suhas Recinos, Infection Prevention on 8/8/2024 at 8:16 AM

## 2024-08-08 NOTE — PROGRESS NOTES
Admission/Transfer from: Admission  2 RN skin assessment completed. Yes with Fang Macedo   Significant findings include:  old scar on ankle, and swelling on R forearm from IV extravagation   [facility] Nurse Consult Ordered? No

## 2024-08-08 NOTE — PROVIDER NOTIFICATION
MD Notification    Notified Person: MD    Notified Person Name: Osvaldo     Notification Date/Time: 8/8/24 0015    Notification Interaction: vocera    Purpose of Notification: stool study came back positive for campylobacter    Orders Received: pending    Comments:

## 2024-08-09 PROBLEM — A04.5 CAMPYLOBACTER DIARRHEA: Status: ACTIVE | Noted: 2024-08-09

## 2024-08-09 LAB
ERYTHROCYTE [DISTWIDTH] IN BLOOD BY AUTOMATED COUNT: 11.9 % (ref 10–15)
HCT VFR BLD AUTO: 32.1 % (ref 35–47)
HGB BLD-MCNC: 10.7 G/DL (ref 11.7–15.7)
HOLD SPECIMEN: NORMAL
MCH RBC QN AUTO: 31.1 PG (ref 26.5–33)
MCHC RBC AUTO-ENTMCNC: 33.3 G/DL (ref 31.5–36.5)
MCV RBC AUTO: 93 FL (ref 78–100)
PLATELET # BLD AUTO: 111 10E3/UL (ref 150–450)
RBC # BLD AUTO: 3.44 10E6/UL (ref 3.8–5.2)
WBC # BLD AUTO: 3.7 10E3/UL (ref 4–11)

## 2024-08-09 PROCEDURE — 250N000011 HC RX IP 250 OP 636: Performed by: HOSPITALIST

## 2024-08-09 PROCEDURE — 258N000003 HC RX IP 258 OP 636: Performed by: INTERNAL MEDICINE

## 2024-08-09 PROCEDURE — 120N000001 HC R&B MED SURG/OB

## 2024-08-09 PROCEDURE — 250N000013 HC RX MED GY IP 250 OP 250 PS 637: Performed by: INTERNAL MEDICINE

## 2024-08-09 PROCEDURE — 99232 SBSQ HOSP IP/OBS MODERATE 35: CPT | Performed by: HOSPITALIST

## 2024-08-09 PROCEDURE — 250N000013 HC RX MED GY IP 250 OP 250 PS 637: Performed by: HOSPITALIST

## 2024-08-09 PROCEDURE — 36415 COLL VENOUS BLD VENIPUNCTURE: CPT | Performed by: HOSPITALIST

## 2024-08-09 PROCEDURE — 85027 COMPLETE CBC AUTOMATED: CPT | Performed by: HOSPITALIST

## 2024-08-09 RX ORDER — SODIUM CHLORIDE 9 MG/ML
INJECTION, SOLUTION INTRAVENOUS CONTINUOUS
Status: DISCONTINUED | OUTPATIENT
Start: 2024-08-09 | End: 2024-08-10 | Stop reason: HOSPADM

## 2024-08-09 RX ORDER — AZITHROMYCIN 500 MG/1
500 TABLET, FILM COATED ORAL EVERY 24 HOURS
Qty: 1 TABLET | Refills: 0 | Status: SHIPPED | OUTPATIENT
Start: 2024-08-10 | End: 2024-08-10

## 2024-08-09 RX ORDER — SIMETHICONE 80 MG
80 TABLET,CHEWABLE ORAL EVERY 6 HOURS PRN
Status: DISCONTINUED | OUTPATIENT
Start: 2024-08-09 | End: 2024-08-10 | Stop reason: HOSPADM

## 2024-08-09 RX ADMIN — KETOROLAC TROMETHAMINE 30 MG: 30 INJECTION, SOLUTION INTRAMUSCULAR at 13:57

## 2024-08-09 RX ADMIN — ONDANSETRON 4 MG: 2 INJECTION INTRAMUSCULAR; INTRAVENOUS at 14:05

## 2024-08-09 RX ADMIN — SIMETHICONE 80 MG: 80 TABLET, CHEWABLE ORAL at 16:56

## 2024-08-09 RX ADMIN — ACETAMINOPHEN 650 MG: 325 TABLET, FILM COATED ORAL at 11:25

## 2024-08-09 RX ADMIN — ACETAMINOPHEN 650 MG: 325 TABLET, FILM COATED ORAL at 00:46

## 2024-08-09 RX ADMIN — KETOROLAC TROMETHAMINE 30 MG: 30 INJECTION, SOLUTION INTRAMUSCULAR at 02:20

## 2024-08-09 RX ADMIN — AZITHROMYCIN DIHYDRATE 500 MG: 250 TABLET ORAL at 09:14

## 2024-08-09 RX ADMIN — SODIUM CHLORIDE: 9 INJECTION, SOLUTION INTRAVENOUS at 16:56

## 2024-08-09 RX ADMIN — ACETAMINOPHEN 650 MG: 325 TABLET, FILM COATED ORAL at 19:49

## 2024-08-09 ASSESSMENT — ACTIVITIES OF DAILY LIVING (ADL)
ADLS_ACUITY_SCORE: 18

## 2024-08-09 NOTE — PLAN OF CARE
Goal Outcome Evaluation:    PRIMARY Concern: Diarrhea/Constipation for 2 mos/ Abd.pain  SAFETY RISK Concerns (fall risk, behaviors, etc.): Green    Isolation/Type: none, standard for incontinence cares   Tests/Procedures for NEXT shift: AM labs  Consults? (Pending/following, signed-off?) GI following-acute pancolitis-campylobacter species  Where is patient from? (Home, TCU, etc.): Home  Other Important info for NEXT shift: R Arm healing from IV site extravasation from ER  Anticipated DC date & active delays: TBD    Orientation: A/Ox4    Vitals/Tele: VSS RA    IV Access/drains: LR infusing 75ml/hr    Diet: Full liquid-tolerating    Mobility: independent    GI/: continent b/b    Wound/Skin: R Arm healing from IV site extravasation from ER    Consults: GI    Discharge Plan: pending-Anticipated in 2-4 Days, possibly tmrw pending clinical course - diet toleration etc         Observation goals  PRIOR TO DISCHARGE       Comments:   -diagnostic tests and consults completed and resulted: MET  -vital signs normal or at patient baseline: MET  -tolerating oral intake to maintain hydration: MET  -adequate pain control on oral analgesics: NOT MET, still with episodes of pain.  -returns to baseline functional status: NOT MET        See Flow sheets for assessment

## 2024-08-09 NOTE — PROGRESS NOTES
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  CT abdomen/pelvis in the ER showed findings consistent with pancolitis.  She was given IV fluids and pain medications and the hospitalist service was contacted to bring her into the hospital for observation.     Pancolitis - campylobacter  Abdominal pain  Elevated AST  Several months of alternating diarrhea/constipation.  No improvement with adjustment of her diet.  Developed abdominal pain and distention 2-3 days prior to presentation.  Having about one loose stool per day over the last 3 days.  No nausea or vomiting.  Fever/chills the night prior to admission.  CT abdomen/pelvis showed pancolitis.  Temp 99.4.  WBC within normal limits.  AST 53, other LFTs within normal limits.  [place] to observation. If not significantly improved tomorrow, consider inpatient admission.  GI consult, appreciate their assistance.  Stool studies showed + campylobacter  As needed pain and nausea medications available.  IV fluids for now until oral intake improves.  Defer antibiotics to GI -  cipro/flagyl stopped, transitioned to azithro 500 mg PO x 3 days 8/8 8/9 improving slowly, discussed with [facility] and patient - if tolerating LFD can discharge home with outpt GI clinic follow up being arranged. Will have one more dose of azithromycin 8/10. Discharge orders will be placed but if not improved/ready, will cancel and monitor another day in hospital.       Pancytopenia, likely in part due to GI infection as above  Thrombocytopenia  Platelets 121 in the ER on 8/7/2024.  Previous baseline of 183 on 10/6/2022.  Plt 101 8/8, WBC 3.9, hgb 10.4  Follow cbc - ordered 8/9, if not obtained can follow up with PCP     Liver lesion  Right adrenal  nodule  Right middle lobe lung nodule  CT abdomen/pelvis in the ER on 8/7/2024 incidentally showed an indeterminant 2.7 x 2.0 cm lesion in the central liver, indeterminant 1.8 x 1.6 cm right adrenal nodule, and 4 millimeter nodule in the right lung middle lobe.  Recommend nonemergent follow-up liver MRI to evaluate the liver lesion and adrenal nodule.  Recommend follow-up CT chest in 12 months.  8/9 confirmed with patient - aforementioned findings were discussed with her upon admission- we reviewed each of them and she will be visiting with her PCP after discharge and further planning to be made at that point. No imaging will be ordered at discharge which she understands.    History of stage IA breast cancer  Diagnosed in 2017 and underwent mastectomy. No chemotherapy or radiation was recommended. She took tamoxifen and then letrozole post-operatively, both were stopped due to side effects.  Noted.          Diet: Low Fiber Diet    DVT Prophylaxis: Pneumatic Compression Devices and Ambulate every shift  Reddy Catheter: Not present  Lines: None     Cardiac Monitoring: None  Code Status: Full Code      Clinically Significant Risk Factors Present on Admission              # Hypoalbuminemia: Lowest albumin = 3.4 g/dL at 8/8/2024  6:57 AM, will monitor as appropriate   # Thrombocytopenia: Lowest platelets = 101 in last 2 days, will monitor for bleeding      # Anemia: based on hgb <11                        Disposition Plan     Medically Ready for Discharge: Anticipated Today pending diet toleration             Nilesh Jolly MD  Hospitalist Service  [facility]  Securely message with [email or web address] (more info)  Text page via [employer] Paging/Directory   ______________________________________________________________________    Interval History   Seen and examined. Overall improving, still some discomfort/cramping with po intake. GI present during visit - ok with discharge on low fiber diet for 2 weeks if  tolerated while here today. No new issues otherwise.    Physical Exam   Vital Signs: Temp: 97.7  F (36.5  C) Temp src: Oral BP: (!) 144/82 Pulse: 59   Resp: 16 SpO2: 96 % O2 Device: None (Room air)    Weight: 151 lbs 7.3 oz    Gen: NAD, pleasant  HEENT: EOMI, MMM  Resp: no focal crackles,  no wheezes, no increased work of resp  CV: S1S2 heard, reg rhythm, reg rate  Abdo: soft, nontender, nondistended, bowel sounds present  Ext: calves nontender, well perfused  Neuro: aa, conversant, moving ext, CN grossly intact, no facial asymmetry      Medical Decision Making       40 MINUTES SPENT BY ME on the date of service doing chart review, history, exam, documentation & further activities per the note.      Data

## 2024-08-09 NOTE — PROVIDER NOTIFICATION
1530: Pt unable to tolerate low fiber diet; crying/complaining of abdominal bloating and cramping after eating. Pt required IV toradol to control pain. ROYER Craig from  notified via telephone. Order received to downgrade diet back to full liquids and keep pt tonight. Dr. Jolly also updated via [employer] messaging. .     Addendum 1553/ order received from  for clear liquid diet.

## 2024-08-09 NOTE — PLAN OF CARE
PRIMARY Concern: Diarrhea/Constipation for 2 mos/ Abd.pain  SAFETY RISK Concerns (fall risk, behaviors, etc.): Green    Isolation/Type: none, standard for incontinence cares   Tests/Procedures for NEXT shift: AM labs  Consults? (Pending/following, signed-off?) GI following  Where is patient from? (Home, TCU, etc.): Home  Other Important info for NEXT shift: R Arm healing from IV site extravasation from ER  Anticipated DC date & active delays: TBD    SUMMARY NOTE:  Orientation/Cognitive: A&Ox4  Observation Goals (Met/ Not Met): Not met  Mobility Level/Assist Equipment: Ind  Antibiotics & Plan (IV/po, length of tx left): azithro oral   Pain Management: x1 dilaudid for 8/10 pain, and x1 tylenol and tordol given   Tele/VS/O2: VSS on RA  ABNL Lab/BG: CT abd: Acute Pancolitis. Stool sample: (+) for Campylobacter species  Diet: Full liquid diet- tolerating fair   Bowel/Bladder: Cont  Skin Concerns: R Arm healing from IV site extravasation from ER  Drains/Devices: IVF LR running at 75ml/hr  Patient Stated Goal for Today: Sleep    Observation goals  PRIOR TO DISCHARGE       Comments:   -diagnostic tests and consults completed and resulted: MET  -vital signs normal or at patient baseline: MET  -tolerating oral intake to maintain hydration: MET  -adequate pain control on oral analgesics: NOT MET, still with episodes of pain.  -returns to baseline functional status: NOT MET

## 2024-08-09 NOTE — PROGRESS NOTES
"[facility] Progress Note     Interval History:  BS+ Passing gas. Had diarrhea a couple hours after full liquid meal yesterday and felt much better. No BM today, feels distended/gassy and like nothing is moving again. Hoping to go home today. Still with tenderness, but pain improving and now only in the lower abdomen. She has noted improvement with rebound tenderness.     Physical Exam:    /83   Pulse 62   Temp 97.6  F (36.4  C) (Oral)   Resp 16   Ht 1.702 m (5' 7\")   Wt 68.7 kg (151 lb 7.3 oz)   SpO2 98%   BMI 23.72 kg/m      Constitutional: No acute distress  Cardiovascular: RRR, normal S1/S2  Respiratory: Effort normal, CTA bilaterally  Abdomen: Soft, +tender in lower abdomen with guarding, BS+     Laboratory Data  Recent Labs   Lab Test 08/08/24  0657 08/07/24  1057   WBC 3.9* 6.4   HGB 10.4* 12.7   MCV 92 93   * 121*     Recent Labs   Lab Test 08/08/24  0657 08/07/24  1057    135   POTASSIUM 3.8 3.9   CHLORIDE 103 99   CO2 23 26   BUN 10.4 9.1   CR 0.81 0.85   ANIONGAP 10 10   ELROY 8.7* 9.2     Recent Labs   Lab Test 08/08/24  0657 08/07/24  1144 08/07/24  1057   ALBUMIN 3.4*  --  4.1   BILITOTAL 0.3  --  0.5   ALT 29  --  44   AST 27  --  53*   ALKPHOS 73  --  92   PROTEIN  --  30*  --    LIPASE  --   --  14       Imaging  CT ABDOMEN PELVIS W CONTRAST 8/7/2024 11:26 AM   IMPRESSION:   1.  Acute pancolitis, most severe in the ascending colon and  transverse colon. This may be infectious or inflammatory.  2.  Indeterminate 2.7 x 2.0 cm lesion in the central liver, possibly a  cyst or hemangioma. Consider nonemergent follow-up liver MRI.  3.  Indeterminate 1.8 x 1.6 cm right adrenal nodule. Attention on MRI.  4.  A 4 mm nodule in the right middle lobe. Consider follow-up chest  CT in 12 months.  Assessment & Plan:  Campylobacter colitis  -Azithromycin 500mg x3 days   -Full liquid diet-->low fiber diet as tolerated   -Low fiber diet for 2 weeks   -Still with tenderness but this is improving, " rebound is improving and VS and WBC remain normal  -Offered gas X/dicyclomine - pt did not feel that was necessary at this time. Continue heating pad   -Follow up colonoscopy and liver MRI - we will contact her to schedule these next week   -OK to discharge if able to tolerate low fiber diet     Will discuss with Dr. Enio Lynn, [facility]  Cell: [phone]   Office: [phone]

## 2024-08-09 NOTE — PLAN OF CARE
Pt A&Ox4; VSS except for elevated BP. Pt's diet was advanced to low fiber today but unable to tolerate it (see MD notification). Pt reported more abdominal discomfort/bloating as well as nausea after eating. She also reports she is no longer able to pass flatus (was able to pass flatus early this AM). BS present. Small diarrhea x1 this AM. IV toradol and zofran given for pain and nausea, respectively, helpful per pt. No emeisis. Diet also downgraded to clear liquids. Plan for CT of abdomen/pelvis pending. Otherwise, pt independent; voiding without difficulties. Anxious to go home. Will continue to monitor.

## 2024-08-10 ENCOUNTER — APPOINTMENT (OUTPATIENT)
Dept: CT IMAGING | Facility: CLINIC | Age: 64
DRG: 372 | End: 2024-08-10
Attending: INTERNAL MEDICINE
Payer: COMMERCIAL

## 2024-08-10 VITALS
RESPIRATION RATE: 18 BRPM | TEMPERATURE: 98.1 F | BODY MASS INDEX: 23.77 KG/M2 | OXYGEN SATURATION: 99 % | SYSTOLIC BLOOD PRESSURE: 142 MMHG | WEIGHT: 151.46 LBS | HEART RATE: 77 BPM | HEIGHT: 67 IN | DIASTOLIC BLOOD PRESSURE: 91 MMHG

## 2024-08-10 PROCEDURE — 250N000011 HC RX IP 250 OP 636: Performed by: HOSPITALIST

## 2024-08-10 PROCEDURE — 99239 HOSP IP/OBS DSCHRG MGMT >30: CPT | Performed by: HOSPITALIST

## 2024-08-10 PROCEDURE — 250N000009 HC RX 250: Performed by: HOSPITALIST

## 2024-08-10 PROCEDURE — 74177 CT ABD & PELVIS W/CONTRAST: CPT

## 2024-08-10 PROCEDURE — 250N000013 HC RX MED GY IP 250 OP 250 PS 637: Performed by: INTERNAL MEDICINE

## 2024-08-10 RX ORDER — KETOROLAC TROMETHAMINE 15 MG/ML
15 INJECTION, SOLUTION INTRAMUSCULAR; INTRAVENOUS EVERY 6 HOURS PRN
Status: DISCONTINUED | OUTPATIENT
Start: 2024-08-10 | End: 2024-08-10 | Stop reason: HOSPADM

## 2024-08-10 RX ORDER — IOPAMIDOL 755 MG/ML
75 INJECTION, SOLUTION INTRAVASCULAR ONCE
Status: COMPLETED | OUTPATIENT
Start: 2024-08-10 | End: 2024-08-10

## 2024-08-10 RX ADMIN — IOPAMIDOL 75 ML: 755 INJECTION, SOLUTION INTRAVENOUS at 02:04

## 2024-08-10 RX ADMIN — SIMETHICONE 80 MG: 80 TABLET, CHEWABLE ORAL at 00:58

## 2024-08-10 RX ADMIN — AZITHROMYCIN DIHYDRATE 500 MG: 250 TABLET ORAL at 08:36

## 2024-08-10 RX ADMIN — SODIUM CHLORIDE 62 ML: 9 INJECTION, SOLUTION INTRAVENOUS at 02:00

## 2024-08-10 ASSESSMENT — ACTIVITIES OF DAILY LIVING (ADL)
ADLS_ACUITY_SCORE: 18

## 2024-08-10 NOTE — PROVIDER NOTIFICATION
"MD Notification    Notified Person: MD    Notified Person Name: Delon Davila    Notification Date/Time: 8/9/24 @2251    Notification Interaction: [employer] messaging     Purpose of Notification: Pt requesting to have Toradol ordered. Advised patient that it was ordered for 3 administration and no longer available. Pt would like to have this as an option since she would like to stay away from narcotics.    Orders Received: none. MD wrote: \"She has Tylenol available\"     Comments:    "

## 2024-08-10 NOTE — PROGRESS NOTES
Observation goals  PRIOR TO DISCHARGE        Comments: -diagnostic tests and consults completed and resulted  No further testing needed @this time.   -vital signs normal or at patient baseline: met  -tolerating oral intake to maintain hydration: met:   Full Liquid Diet  -adequate pain control on oral analgesics: met  -returns to baseline functional status: met

## 2024-08-10 NOTE — CONSULTS
Campylobacter gastroenteritis:    Type of precautions: standard/Contact  **contact precautions only required incontinent persons    Duration of Precautions: duration of illness/until symptoms resolve

## 2024-08-10 NOTE — PLAN OF CARE
Goal Outcome Evaluation:      Plan of Care Reviewed With: patient    Overall Patient Progress: improving       PRIMARY Concern: Here with abd pain and bloating,Diarrhea/Constipation   SAFETY RISK Concerns (fall risk, behaviors, etc.): none  Isolation/Type:n/a  Tests/Procedures for NEXT shift:   Consults? (Pending/following, signed-off?) GI following  Where is patient from? (Home, TCU, etc.): Home  Other Important info for NEXT shift: Pt hoping to go home today, says hospitalist promised her they will be here early in the morning to see  Anticipated DC date & active delays:possibly today    SUMMARY NOTE:  Orientation/Cognitive: A&Ox4  Observation Goals (Met/ Not Met): Not met  Mobility Level/Assist Equipment: Ind  Antibiotics & Plan (IV/po, length of tx left): Cipro IV (not compatible to LR IVF) + Flagyl IV  Pain Management: Denies pain, PRNs available, requested for I.V Toradol, on call hospitalist ordered same, pt says she will request it when she is in pain  Tele/VS/O2: VSS on RA  ABNL Lab/BG: CT abd repeated earlier today, see chart for result  Diet: Clears, tolerated  Bowel/Bladder: Continent  Skin Concerns: scattered bruising  Drains/Devices: IVF N/S @ 75ml/hr  Patient Stated Goal for Today: Feel better ad go home

## 2024-08-10 NOTE — PLAN OF CARE
[id number]  PRIMARY Concern: pancolitis   SAFETY RISK Concerns (fall risk, behaviors, etc.): none      Isolation/Type: none  Tests/Procedures for NEXT shift: CT scan  Consults? (Pending/following, signed-off?) GI following   Where is patient from? (Home, TCU, etc.): home  Other Important info for NEXT shift: patient would like to avoid using narcotics if possible  Anticipated DC date & active delays: TBD   _____________________________________________________________________________  SUMMARY NOTE:  Orientation/Cognitive: A&Ox4  Mobility Level/Assist Equipment: independent  Antibiotics & Plan (IV/po, length of tx left): oral antibiotics   Pain Management: PRN Tylenol given and heat packs.   Tele/VS/O2: slight HTN, all other VSS on RA  ABNL Lab/BG: n/a  Diet: clears  Bowel/Bladder: continent   Skin Concerns: recent right arm IV extravasation per pt   Drains/Devices: IVF @75 mL/hr  Patient Stated Goal for Today: pain control

## 2024-08-10 NOTE — PROGRESS NOTES
"GASTROENTEROLOGY PROGRESS NOTE       SUBJECTIVE:  Feels much better. CT overnight showing improved colitis, gallbladder wall thickening.     OBJECTIVE:  BP (!) 134/90 (BP Location: Right arm)   Pulse 69   Temp 98.2  F (36.8  C) (Oral)   Resp 18   Ht 1.702 m (5' 7\")   Wt 68.7 kg (151 lb 7.3 oz)   SpO2 99%   BMI 23.72 kg/m    Temp (24hrs), Av.1  F (36.7  C), Min:97.9  F (36.6  C), Max:98.2  F (36.8  C)    Patient Vitals for the past 72 hrs:   Weight   24 0300 68.7 kg (151 lb 7.3 oz)       Intake/Output Summary (Last 24 hours) at 8/10/2024 0937  Last data filed at 2024 1330  Gross per 24 hour   Intake 240 ml   Output --   Net 240 ml        PHYSICAL EXAM     Gastrointestinal: Active BS, soft, ND, minimal tenderness        Recent Labs   Lab Test 24  1050 24  0657 24  1057   WBC 3.7* 3.9* 6.4   HGB 10.7* 10.4* 12.7   MCV 93 92 93   * 101* 121*     Recent Labs   Lab Test 24  0657 24  1057   POTASSIUM 3.8 3.9   CHLORIDE 103 99   CO2 23 26   BUN 10.4 9.1   ANIONGAP 10 10     Recent Labs   Lab Test 24  0657 24  1144 24  1057   ALBUMIN 3.4*  --  4.1   BILITOTAL 0.3  --  0.5   ALT 29  --  44   AST 27  --  53*   PROTEIN  --  30*  --    LIPASE  --   --  14           Active Problems:  Campylobacter diarrhea    Assessment: Clinically improved, CT with less inflammation. Suspect thickened gallbladder related to nearby inflamed colon. Does not appear to have cholecystitis.    Plan: Full liquid diet. Home soon.          Leo Folyd MD  [facility]  Office:  [phone]  "

## 2024-08-10 NOTE — DISCHARGE SUMMARY
[facility]  Hospitalist Discharge Summary      Date of Admission:  8/7/2024  Date of Discharge:  8/10/2024  Discharging Provider: Nilesh Jolly MD  Discharge Service: Hospitalist Service    Discharge Diagnoses   Campylobacter diarrhea - improving  Pancolitis due to above  Pancytopenia, likely related to infection  Possible sclerotic spine and pelvic lesions  Liver lesion  Right adrenal nodule  Right middle lobe lung nodule  History of stage IA breast cancer      Clinically Significant Risk Factors          Follow-ups Needed After Discharge   Follow-up Appointments     Follow-up and recommended labs and tests       [facility] - colonoscopy and liver MRI  PCP for hospitalization follow and discussion of repeat imaging for   incidentally found liver lesion, R adrenal nodule, and pulmonary nodule.   Consider CBC repeat in the next few weeks as well.            Unresulted Labs Ordered in the Past 30 Days of this Admission       No orders found from 7/8/2024 to 8/8/2024.        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  CT abdomen/pelvis in the ER showed findings consistent with pancolitis.  She was given IV fluids and pain medications and the hospitalist service was contacted to bring her into the hospital for observation.     Pancolitis - campylobacter  Abdominal pain  Elevated AST  Several months of alternating diarrhea/constipation.  No improvement with adjustment of her diet.  Developed abdominal pain and distention 2-3 days prior to presentation.  Having about one loose stool per day over the last 3 days.  No nausea or vomiting.  Fever/chills the night prior to admission.  CT abdomen/pelvis showed pancolitis.  Temp 99.4.  WBC within normal  limits.  AST 53, other LFTs within normal limits.  [place] to observation. If not significantly improved tomorrow, consider inpatient admission.  GI consult, appreciate their assistance.  Stool studies showed + campylobacter  As needed pain and nausea medications available.  IV fluids for now until oral intake improves.  Defer antibiotics to GI -  cipro/flagyl stopped, transitioned to azithro 500 mg PO x 3 days 8/8 8/9 improving slowly, discussed with [facility] and patient - if tolerating LFD can discharge home with outpt GI clinic follow up being arranged.   Worsening abdo symptoms later in the day - GI repeated CT scan  See full read of CT - colonic wall thickening slightly improved but GB wall thickening noted   RUQ US ordered AM 8/10 - ultimately canceled as clinically improving - GI follow up and can consider further eval if necessary at that time; concur with GI, seems less likely that a new cholecystitis etc has occurred in this context  8/10 - clinically improved, tolerating PO intake, GI ok with discharge and planned follow up     Pancytopenia, likely in part due to GI infection as above  Thrombocytopenia  Platelets 121 in the ER on 8/7/2024.  Previous baseline of 183 on 10/6/2022.  Plt 101 8/8, WBC 3.9, hgb 10.4  8/9 - CBC fairly stable, recheck CBC if remaining hospitalized vs repeat with PCP in 1 week or so - patient voiced understanding     Liver lesion  Right adrenal nodule  Right middle lobe lung nodule  CT abdomen/pelvis in the ER on 8/7/2024 incidentally showed an indeterminant 2.7 x 2.0 cm lesion in the central liver, indeterminant 1.8 x 1.6 cm right adrenal nodule, and 4 millimeter nodule in the right lung middle lobe.  Recommend nonemergent follow-up liver MRI to evaluate the liver lesion and adrenal nodule.  Recommend follow-up CT chest in 12 months.  8/9 confirmed with patient - aforementioned findings were discussed with her upon admission- we reviewed each of them and she will be visiting with  her PCP after discharge and further planning to be made at that point. No imaging will be ordered at discharge which she understands.     History of stage IA breast cancer  Diagnosed in 2017 and underwent mastectomy. No chemotherapy or radiation was recommended. She took tamoxifen and then letrozole post-operatively, both were stopped due to side effects.  Noted.    Possible sclerotic  bone lesions of spine and pelvis  *Noted on repeat CT Abdo pelvis 8/9 -interestingly not mentioned or greatly appreciated on initial CT Abdo pelvis.  Hospitalist went through this with patient and we looked through images.  With history of breast cancer advised patient to follow-up with her prior oncology team and/or PCP for next steps for likely further imaging-possible PET scan which showed not be done inpatient.  Patient does confirm she has not had symptoms such as night sweats fevers chills or unintentional weight loss etc.  Further workup as outpatient.    Consultations This Hospital Stay   GASTROENTEROLOGY IP CONSULT  PHARMACY IP CONSULT  INFECTION PREVENTION IP CONSULT    Code Status   Full Code    Time Spent on this Encounter   I, Nilesh Jolly MD, personally saw the patient today and spent greater than 30 minutes discharging this patient.       Nilesh Jolly MD  [facility]  [address] [place] [id number]  Phone: [phone]  ______________________________________________________________________    Physical Exam   Vital Signs: Temp: 98.1  F (36.7  C) Temp src: Oral BP: (!) 142/91 Pulse: 77   Resp: 18 SpO2: 99 % O2 Device: None (Room air)    Weight: 151 lbs 7.3 oz    Gen: NAD, extremely pleasant  HEENT: EOMI, MMM  Resp: no audible crackles or wheeze, not labored  Abdo: soft, nontender, nondistended, bowel sounds present  Ext: appear well perfused  Neuro: aa, conversant, moving ext, CN grossly intact, no facial asymmetry         Primary Care Physician    Lore Meraz    Discharge Orders      Reason for your hospital stay    Abdominal pain and diarrhea     Follow-up and recommended labs and tests     [facility] - [facility]oscopy and liver MRI  PCP for hospitalization follow and discussion of repeat imaging for incidentally found liver lesion, R adrenal nodule, and pulmonary nodule. Consider CBC repeat in the next few weeks as well.     Activity    Your activity upon discharge: activity as tolerated     Discharge Instructions    Continue with plan to follow up with [facility] and PCP as we discussed. GI recommends low fiber diet for the next two weeks.  Contact your prior oncology team and/or PCP for next steps with regard to possible spine/pelvic bone lesions noted on CT.     Diet    Follow this diet upon discharge: Orders Placed This Encounter      Low Fiber Diet for 2 weeks       Significant Results and Procedures   Most Recent 3 CBC's:  Recent Labs   Lab Test 08/09/24  1050 08/08/24  0657 08/07/24  1057   WBC 3.7* 3.9* 6.4   HGB 10.7* 10.4* 12.7   MCV 93 92 93   * 101* 121*     Most Recent 3 BMP's:  Recent Labs   Lab Test 08/08/24  0657 08/07/24  1057    135   POTASSIUM 3.8 3.9   CHLORIDE 103 99   CO2 23 26   BUN 10.4 9.1   CR 0.81 0.85   ANIONGAP 10 10   ELROY 8.7* 9.2   GLC 91 122*     Most Recent 2 LFT's:  Recent Labs   Lab Test 08/08/24  0657 08/07/24  1057   AST 27 53*   ALT 29 44   ALKPHOS 73 92   BILITOTAL 0.3 0.5     7-Day Micro Results       Collected Updated Procedure Result Status      08/07/2024 1810 08/07/2024 2355 Enteric Bacteria and Virus Panel PCR [[id number]]    (Abnormal)   Stool from Per Rectum    Final result Component Value   Campylobacter species Positive   Salmonella species Negative   Vibrio species Negative   Vibrio cholerae Negative   Yersinia enterocolitica Negative   Enteropathogenic E. coli (EPEC) Negative   Shiga-like toxin-producing E. coli (STEC) Negative   Shigella/Enteroinvasive E. coli (EIEC) Negative    Cryptosporidium species Negative   Giardia lamblia Negative   Norovirus Gl/Gll Negative   Rotavirus A Negative   Plesiomonas shigelloides Negative   Enteroaggregative E. coli (EAEC) Negative   Enterotoxigenic E. coli (ETEC) Negative   E. coli O157 NA   Cyclospora cayetanensis Negative   Entamoeba histolytica Negative   Adenovirus F40/41 Negative   Astrovirus Negative   Sapovirus Negative            08/07/2024 1809 08/07/2024 2316 C. difficile Toxin B PCR with reflex to C. difficile Antigen and Toxins A/B EIA [[id number]]    Stool from Per Rectum    Final result Component Value   C Difficile Toxin B by PCR Negative   A negative result does not exclude actual disease due to C. difficile and may be due to improper collection, handling and storage of the specimen or the number of organisms in the specimen is below the detection limit of the assay.                  Most Recent Urinalysis:  Recent Labs   Lab Test 08/07/24  1144   COLOR Yellow   APPEARANCE Clear   URINEGLC Negative   URINEBILI Negative   URINEKETONE 20*   SG 1.010   UBLD Small*   URINEPH 7.5*   PROTEIN 30*   NITRITE Negative   LEUKEST Small*   RBCU 17*   WBCU 4   ,   Results for orders placed or performed during the hospital encounter of 08/07/24   CT Abdomen Pelvis w Contrast    Narrative    CT ABDOMEN PELVIS W CONTRAST 8/7/2024 11:26 AM    CLINICAL HISTORY: diffuse abd pain    TECHNIQUE: CT scan of the abdomen and pelvis was performed following  injection of IV contrast. Multiplanar reformats were obtained. Dose  reduction techniques were used.  CONTRAST: 71 mL Isovue-370    COMPARISON: None.    FINDINGS:   LOWER CHEST: A 4 mm nodule in the right middle lobe.    HEPATOBILIARY: Indeterminate hypodense lesion in the central liver  measuring 2.7 x 2.0 cm (series 4, image 34). No calcified gallstones  or biliary ductal dilatation.    PANCREAS: Normal.    SPLEEN: Normal.    ADRENAL GLANDS: A 1.8 x 1.6 cm nodule in the right adrenal  gland,  indeterminate.    KIDNEYS/BLADDER: Normal.    BOWEL: Marked circumferential wall thickening involving the ascending  colon, transverse colon, and to a lesser extent, the descending colon  and sigmoid colon with surrounding fat stranding, consistent with  pancolitis. No small bowel or colonic obstruction. Normal appendix.    PELVIC ORGANS: No pelvic masses.    ADDITIONAL FINDINGS: No lymphadenopathy in the abdomen and pelvis. No  abdominal aortic aneurysm. Small amount of free fluid in the pelvis.  No fluid collections or free air.    MUSCULOSKELETAL: Unremarkable.      Impression    IMPRESSION:   1.  Acute pancolitis, most severe in the ascending colon and  transverse colon. This may be infectious or inflammatory.  2.  Indeterminate 2.7 x 2.0 cm lesion in the central liver, possibly a  cyst or hemangioma. Consider nonemergent follow-up liver MRI.  3.  Indeterminate 1.8 x 1.6 cm right adrenal nodule. Attention on MRI.  4.  A 4 mm nodule in the right middle lobe. Consider follow-up chest  CT in 12 months.    DELIA MOTA MD         SYSTEM ID:  [id number]   CT Abdomen Pelvis w Contrast    Narrative    EXAM: CT ABDOMEN PELVIS W CONTRAST  LOCATION: [facility]  DATE: 8/10/2024    INDICATION: Infectious colitis. Worsening pain  COMPARISON: CT abdomen and pelvis on 8/7/2024.  TECHNIQUE: CT scan of the abdomen and pelvis was performed after the injection of 75  ml Isovue 370 intravenously. Multiplanar reformats were obtained. Dose reduction techniques were used.    FINDINGS:   LOWER CHEST: Mild basilar pulmonary opacities, likely atelectasis.    ABDOMEN/PELVIS:    HEPATOBILIARY: 3 cm hypodense area in the central liver (series 3 image 42), indeterminate, could represent hepatic hemangioma versus other hepatic lesions. Diffuse gallbladder wall thickening, new as compared to 8/7/2024 exam.    PANCREAS: No main pancreatic ductal dilatation or definite solid pancreatic mass.    SPLEEN: No  splenomegaly.    ADRENAL GLANDS: 1.7 cm right adrenal nodule (series 2 image 27). No left adrenal nodule.    KIDNEYS/BLADDER: No radiodense kidney/ureteral stones or hydronephrosis in either kidney.    BOWEL: Mild colonic wall thickening predominantly of the ascending colon and adjacent transverse colon, slightly improved as compared to 8/7/2024 exam.    PERITONEUM: Small amount of fluid in the pelvis, likely reactive. No free peritoneal or portal venous gas.    PELVIC ORGANS: Unremarkable.    VASCULATURE: Prominent pelvic vasculature, nonspecific, can be seen with pelvic congestion syndrome.    LYMPH NODES: Multiple prominent retroperitoneal lymph nodes, indeterminate, could be reactive.    MUSCULOSKELETAL: Diffuse sclerotic foci, for example at T11 vertebra (series 8 image 65) and left iliac bone (series 5 image 62), indeterminate.      Impression    IMPRESSION:   1.  Slight interval improvement in the previously seen colonic wall thickening predominantly of the ascending and adjacent aspect of the transverse colon as compared to 8/7/2024 exam.  2.  Significant diffuse gallbladder wall thickening, new as compared to 8/7/2024 exam, indeterminate, could be reactive to the adjacent inflammation or might relate to underlying acute cholecystitis, recommend further evaluation with right upper quadrant   abdominal ultrasound.  3.  3 cm hypoattenuating area in the central liver, could represent hepatic hemangioma versus other hepatic lesions, recommend further evaluation with contrast-enhanced MRI for more definite characterization, if clinically warranted.  4.  1.7 cm right adrenal nodule, indeterminate, could represent adrenal adenoma versus other adrenal lesions, can be further evaluated with CT adrenal mass protocol or abdominal MRI at the same time when evaluating the aforementioned indeterminate liver   lesion.  5.  Few small sclerotic foci in the spine and pelvic bones, indeterminate, in patient with a history of  malignancy, cannot exclude small sclerotic metastases.  6.  Small amount of free fluid in the pelvis, indeterminate, could be physiological or reactive.       Discharge Medications   Current Discharge Medication List        CONTINUE these medications which have NOT CHANGED    Details   acetaminophen (TYLENOL) 325 MG tablet Take 650 mg by mouth every 6 hours as needed for mild pain      celecoxib (CELEBREX) 200 MG capsule Take 200 mg by mouth daily as needed for moderate pain      melatonin 1.5 mg half-tab Take 1 half-tab by mouth nightly as needed for sleep      naproxen (NAPROSYN) 250 MG tablet Take 500 mg by mouth daily as needed for moderate pain           Allergies   No Known Allergies

## 2024-08-10 NOTE — PROGRESS NOTES
Observation goals  PRIOR TO DISCHARGE        Comments: -diagnostic tests and consults completed and resulted  US Abd ordered  -vital signs normal or at patient baseline: met  -tolerating oral intake to maintain hydration: progressing  -adequate pain control on oral analgesics: met  -returns to baseline functional status: met           (3) slightly limited

## 2024-08-16 LAB
ADV 40+41 DNA STL QL NAA+NON-PROBE: NEGATIVE
ASTRO TYP 1-8 RNA STL QL NAA+NON-PROBE: NEGATIVE
C CAYETANENSIS DNA STL QL NAA+NON-PROBE: NEGATIVE
CAMPYLOBACTER DNA SPEC NAA+PROBE: POSITIVE
CRYPTOSP DNA STL QL NAA+NON-PROBE: NEGATIVE
E COLI O157 DNA STL QL NAA+NON-PROBE: ABNORMAL
E HISTOLYT DNA STL QL NAA+NON-PROBE: NEGATIVE
EAEC ASTA GENE ISLT QL NAA+PROBE: NEGATIVE
EC STX1+STX2 GENES STL QL NAA+NON-PROBE: NEGATIVE
EPEC EAE GENE STL QL NAA+NON-PROBE: NEGATIVE
ETEC LTA+ST1A+ST1B TOX ST NAA+NON-PROBE: NEGATIVE
G LAMBLIA DNA STL QL NAA+NON-PROBE: NEGATIVE
NOROVIRUS GI+II RNA STL QL NAA+NON-PROBE: NEGATIVE
P SHIGELLOIDES DNA STL QL NAA+NON-PROBE: NEGATIVE
RVA RNA STL QL NAA+NON-PROBE: NEGATIVE
SALMONELLA SP RPOD STL QL NAA+PROBE: NEGATIVE
SAPO I+II+IV+V RNA STL QL NAA+NON-PROBE: NEGATIVE
SHIGELLA SP+EIEC IPAH ST NAA+NON-PROBE: NEGATIVE
V CHOLERAE DNA SPEC QL NAA+PROBE: NEGATIVE
VIBRIO DNA SPEC NAA+PROBE: NEGATIVE
Y ENTEROCOL DNA STL QL NAA+PROBE: NEGATIVE

## 2024-10-13 ENCOUNTER — HEALTH MAINTENANCE LETTER (OUTPATIENT)
Age: 64
End: 2024-10-13

## (undated) DEVICE — SOL WATER IRRIG 1000ML BOTTLE 2F7114

## (undated) RX ORDER — ONDANSETRON 2 MG/ML
INJECTION INTRAMUSCULAR; INTRAVENOUS
Status: DISPENSED
Start: 2020-06-23

## (undated) RX ORDER — FENTANYL CITRATE 50 UG/ML
INJECTION, SOLUTION INTRAMUSCULAR; INTRAVENOUS
Status: DISPENSED
Start: 2020-06-23

## (undated) RX ORDER — DIPHENHYDRAMINE HYDROCHLORIDE 50 MG/ML
INJECTION INTRAMUSCULAR; INTRAVENOUS
Status: DISPENSED
Start: 2020-06-23